# Patient Record
Sex: FEMALE | Race: BLACK OR AFRICAN AMERICAN | NOT HISPANIC OR LATINO | Employment: OTHER | URBAN - METROPOLITAN AREA
[De-identification: names, ages, dates, MRNs, and addresses within clinical notes are randomized per-mention and may not be internally consistent; named-entity substitution may affect disease eponyms.]

---

## 2022-07-05 ENCOUNTER — HOSPITAL ENCOUNTER (EMERGENCY)
Facility: CLINIC | Age: 72
Discharge: HOME | End: 2022-07-05
Attending: INTERNAL MEDICINE | Admitting: INTERNAL MEDICINE
Payer: COMMERCIAL

## 2022-07-05 ENCOUNTER — APPOINTMENT (OUTPATIENT)
Dept: GENERAL RADIOLOGY | Facility: CLINIC | Age: 72
End: 2022-07-05
Attending: INTERNAL MEDICINE
Payer: COMMERCIAL

## 2022-07-05 ENCOUNTER — APPOINTMENT (OUTPATIENT)
Dept: CT IMAGING | Facility: CLINIC | Age: 72
End: 2022-07-05
Attending: INTERNAL MEDICINE
Payer: COMMERCIAL

## 2022-07-05 VITALS
DIASTOLIC BLOOD PRESSURE: 91 MMHG | HEART RATE: 71 BPM | BODY MASS INDEX: 24.84 KG/M2 | WEIGHT: 147 LBS | TEMPERATURE: 97.8 F | OXYGEN SATURATION: 99 % | SYSTOLIC BLOOD PRESSURE: 119 MMHG | RESPIRATION RATE: 16 BRPM
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
## 2022-07-05 DIAGNOSIS — S80.02XA CONTUSION OF LEFT KNEE, INITIAL ENCOUNTER: ICD-10-CM

## 2022-07-05 DIAGNOSIS — S93.509A SPRAIN OF TOE, INITIAL ENCOUNTER: ICD-10-CM

## 2022-07-05 DIAGNOSIS — S10.93XA CONTUSION OF FACE, SCALP AND NECK, INITIAL ENCOUNTER: ICD-10-CM

## 2022-07-05 DIAGNOSIS — S00.83XA CONTUSION OF FACE, SCALP AND NECK, INITIAL ENCOUNTER: ICD-10-CM

## 2022-07-05 DIAGNOSIS — S00.03XA CONTUSION OF FACE, SCALP AND NECK, INITIAL ENCOUNTER: ICD-10-CM

## 2022-07-05 PROCEDURE — 99285 EMERGENCY DEPT VISIT HI MDM: CPT | Mod: 25 | Performed by: INTERNAL MEDICINE

## 2022-07-05 PROCEDURE — 99284 EMERGENCY DEPT VISIT MOD MDM: CPT | Mod: 25 | Performed by: INTERNAL MEDICINE

## 2022-07-05 PROCEDURE — 73562 X-RAY EXAM OF KNEE 3: CPT | Mod: LT

## 2022-07-05 PROCEDURE — 76705 ECHO EXAM OF ABDOMEN: CPT | Mod: 26 | Performed by: INTERNAL MEDICINE

## 2022-07-05 PROCEDURE — 93308 TTE F-UP OR LMTD: CPT | Performed by: INTERNAL MEDICINE

## 2022-07-05 PROCEDURE — 93308 TTE F-UP OR LMTD: CPT | Mod: 26 | Performed by: INTERNAL MEDICINE

## 2022-07-05 PROCEDURE — 73660 X-RAY EXAM OF TOE(S): CPT | Mod: 50

## 2022-07-05 PROCEDURE — 70486 CT MAXILLOFACIAL W/O DYE: CPT

## 2022-07-05 PROCEDURE — 72125 CT NECK SPINE W/O DYE: CPT

## 2022-07-05 PROCEDURE — 76705 ECHO EXAM OF ABDOMEN: CPT | Performed by: INTERNAL MEDICINE

## 2022-07-05 PROCEDURE — 70450 CT HEAD/BRAIN W/O DYE: CPT

## 2022-07-05 ASSESSMENT — ENCOUNTER SYMPTOMS
NECK STIFFNESS: 0
EYE REDNESS: 0
APPETITE CHANGE: 1
CONFUSION: 0
HEADACHES: 0
SHORTNESS OF BREATH: 0
ABDOMINAL PAIN: 1
FEVER: 0
DIFFICULTY URINATING: 0
COLOR CHANGE: 0

## 2022-07-05 NOTE — ED TRIAGE NOTES
Triage Assessment     Row Name 07/05/22 7202       Triage Assessment (Adult)    Airway WDL WDL       Respiratory WDL    Respiratory WDL WDL       Skin Circulation/Temperature WDL    Skin Circulation/Temperature WDL X  swelling, bruising, tenderness to right side of face and left knee, pain to bilateral toes       Cardiac WDL    Cardiac WDL WDL

## 2022-07-06 ASSESSMENT — ENCOUNTER SYMPTOMS
JOINT SWELLING: 1
ARTHRALGIAS: 0
FACIAL SWELLING: 1

## 2022-07-06 NOTE — ED NOTES
Pt c/o falling due to uneven sidewalk and landing on face and L knee. PT has swelling and bruising on L knee. ROM intact. PT able to bear weight and walk, but causes pain. Bruising and pain noted under R eye. Pt also notes broke a front tooth. PT c/o R arm pain at shoulder joint with movement and has decreased ROM.

## 2022-07-06 NOTE — ED PROVIDER NOTES
[facility] EMERGENCY DEPARTMENT ([facility])    7/05/22        History     Chief Complaint   Patient presents with     Fall     Fell Sunday walking uphill; pain and bruising to left knee; pain to bilateral toes; pain, swelling, bruising to right side of face; bit top lip and broke one top front incisor     The history is provided by the patient, a relative and medical records. The history is limited by a language barrier (Maltese). A  was used (Granddaughter).     Byron Jasmine is a 72 year old female with history of osteoarthritis of knee and Vitamin D deficiency. She presents to the ED with pain in her left knee, right arm, and both great toes; and a broken front right tooth. According to her granddaughter, the patient fell on 7/3/22 walking uphill. Patient also complains of increased abdominal pain when she palpates her own abdomen after falling. Patient had previous abdominal surgery for cholecystectomy in 2018. Patient also has decreased appetite since her fall. No associated visual changes.  Patient does have a small abrasion close to her right eye.  She states that she does have appointment to follow-up with a dentist soon.  Patient indicates that following the fall she did have significant right-sided facial swelling which has improved since.          Past Medical History  No past medical history on file.  No past surgical history on file.  acetaminophen (TYLENOL) 325 MG tablet  cholecalciferol 48029 UNITS capsule  Cyanocobalamin (B-12) 1000 MCG TBCR  gabapentin (NEURONTIN) 100 MG capsule  lidocaine (XYLOCAINE) 5 % ointment      No Known Allergies  Family History  Family History   Problem Relation Age of Onset     Family History Negative Mother      Family History Negative Father      Social History   Social History     Tobacco Use     Smoking status: Never Smoker     Smokeless tobacco: Never Used   Substance Use Topics     Alcohol use: No     Drug use: No      Past medical  history, past surgical history, medications, allergies, family history, and social history were reviewed with the patient. No additional pertinent items.       Review of Systems   Constitutional: Positive for appetite change (decreased). Negative for fever.   HENT: Positive for dental problem (front right tooth missing) and facial swelling (right upper). Negative for congestion.    Eyes: Negative for redness and visual disturbance.   Respiratory: Negative for shortness of breath.    Cardiovascular: Negative for chest pain.   Gastrointestinal: Positive for abdominal pain.   Genitourinary: Negative for difficulty urinating.   Musculoskeletal: Positive for joint swelling (left knee and bilateral big toes). Negative for arthralgias and neck stiffness.        Left knee, right arm   Skin: Negative for color change.   Neurological: Negative for headaches.   Psychiatric/Behavioral: Negative for confusion.   All other systems reviewed and are negative.    A complete review of systems was performed with pertinent positives and negatives noted in the HPI, and all other systems negative.    Physical Exam   BP: 125/76  Pulse: 68  Temp: 98.8  F (37.1  C)  Resp: 16  Weight: 66.7 kg (147 lb)  SpO2: 98 %  Physical Exam  Constitutional:       General: She is not in acute distress.     Appearance: She is not diaphoretic.   HENT:      Head: Raccoon eyes, contusion and laceration present. No Ta's sign, abrasion, masses, right periorbital erythema or left periorbital erythema. Hair is normal.      Jaw: There is normal jaw occlusion.        Mouth/Throat:      Pharynx: No oropharyngeal exudate.   Eyes:      General: No scleral icterus.     Pupils: Pupils are equal, round, and reactive to light.   Cardiovascular:      Rate and Rhythm: Normal rate and regular rhythm.      Heart sounds: Normal heart sounds. No murmur heard.    No friction rub. No gallop.   Pulmonary:      Effort: Pulmonary effort is normal. No respiratory distress.       Breath sounds: Normal breath sounds. No stridor. No wheezing, rhonchi or rales.   Chest:      Chest wall: No tenderness.   Abdominal:      General: Abdomen is flat. Bowel sounds are normal. There is no distension.      Palpations: Abdomen is soft. There is no mass.      Tenderness: There is no abdominal tenderness. There is no right CVA tenderness, left CVA tenderness, guarding or rebound.      Hernia: No hernia is present.   Musculoskeletal:      Cervical back: Neck supple.      Right knee: Normal.      Left knee: Swelling and ecchymosis present. No deformity, effusion, erythema, lacerations, bony tenderness or crepitus. Normal range of motion. Tenderness present. Normal alignment, normal meniscus and normal patellar mobility.        Legs:    Skin:     General: Skin is warm.      Findings: No rash.   Neurological:      General: No focal deficit present.      Cranial Nerves: No cranial nerve deficit.         ED Course     9:39 PM  The patient was seen and examined by Marlon Velazquez Md   in Room ED04.   Procedures  Results for orders placed during the hospital encounter of 07/05/22    POC US ABDOMEN LIMITED    Impression  Bedside FAST (Focused Assessment with Sonography in Trauma), performed and interpreted by me.  Indication: Trauma    With the patient in Trendelenburg, the RUQ, LUQ and subxiphoid views were examined for intraabdominal and thoracic free fluid and pericardial effusion. With the patient in reverse Trendelenburg, the suprapubic view was examined for intraabdominal free fluid. Image quality was satisfactory..    Findings: There is no evidence of free fluid above or below bilateral diaphragms, in the splenorenal or hepatorenal space, or in bilateral paracolic gutters. There was no free fluid seen in the pelvis adjacent to the urinary bladder. There is no free fluid within the pericardium.      IMPRESSION:  Negative FAST                     Results for orders placed or performed during the hospital  encounter of 07/05/22   CT Facial Bones without Contrast     Status: None    Narrative    EXAM: CT FACIAL BONES WITHOUT CONTRAST  LOCATION: [facility]  DATE/TIME: 7/5/2022 9:39 PM    INDICATION: fall, facial pain  COMPARISON: None.  TECHNIQUE: Routine CT Maxillofacial without IV contrast. Multiplanar reformats. Dose reduction techniques were used.     FINDINGS:  OSSEOUS STRUCTURES/SOFT TISSUES:   Right inferior frontal scalp mild contusion. No acute fracture. Mandible intact.    Vascular calcifications. Prominent lingual tonsils for age. Dental amalgam resulting in streak artifact. Dental disease with multiple cavities and periapical lucencies.    ORBITAL CONTENTS: No acute abnormality.    SINUSES: Visualized paranasal sinuses and mastoid air cells are essentially clear.    VISUALIZED INTRACRANIAL CONTENTS: No acute abnormality.       Impression    IMPRESSION:   1.  No acute fracture.     Head CT w/o contrast     Status: None    Narrative    EXAM: CT HEAD W/O CONTRAST  LOCATION: [facility]  DATE/TIME: 7/5/2022 9:39 PM    INDICATION: Fall. Blunt head injury.  COMPARISON: None.  TECHNIQUE: Routine CT Head without IV contrast. Multiplanar reformats. Dose reduction techniques were used.    FINDINGS:  INTRACRANIAL CONTENTS: No intracranial hemorrhage, extraaxial collection, or mass effect.  No CT evidence of acute infarct. Normal parenchymal attenuation. Normal ventricles and sulci.     VISUALIZED ORBITS/SINUSES/MASTOIDS: No intraorbital abnormality. No paranasal sinus mucosal disease. No middle ear or mastoid effusion.    BONES/SOFT TISSUES: Right inferior frontal scalp mild soft tissue swelling. No acute displaced calvarial fractures. Vascular calcifications.      Impression    IMPRESSION:  1.  No acute intracranial process.   Cervical spine CT w/o contrast     Status: None    Narrative    EXAM: CT CERVICAL SPINE W/O  CONTRAST  LOCATION: [facility]  DATE/TIME: 7/5/2022 9:40 PM    INDICATION: fall, neck pain  COMPARISON: None.  TECHNIQUE: Routine CT Cervical Spine without IV contrast. Multiplanar reformats. Dose reduction techniques were used.    FINDINGS:  VERTEBRA: No acute fracture.  No acute post traumatic subluxations.   Normal vertebral body heights.    CANAL/FORAMINA: Multilevel spondylosis result in various levels and degrees of central canal stenosis and neural foraminal stenosis. No critical spinal canal stenosis.   Several levels demonstrate mild degenerative grade 1 subluxations.    PARASPINAL: Prominent lingual tonsils for age.        Impression     IMPRESSION:  1.  No acute fracture.  2.  Degenerative changes described above.   XR Knee Left 3 Views     Status: None    Narrative    EXAM: XR KNEE LEFT 3 VIEWS  LOCATION: [facility]  DATE/TIME: 7/5/2022 10:18 PM    INDICATION: fall pain  COMPARISON: None.      Impression    IMPRESSION: Moderate tricompartmental degenerative changes. No plain film evidence of significant joint effusion. No acute bony abnormality.   XR Toe Right G/E 2 Views     Status: None    Narrative    EXAM: XR TOE RIGHT G/E 2 VIEWS  LOCATION: [facility]  DATE/TIME: 7/5/2022 10:18 PM    INDICATION: fall pain  COMPARISON: None.      Impression    IMPRESSION: Moderately advanced degenerative changes at the first metatarsal phalangeal joint as well as the interphalangeal joint. No evidence of fracture.   XR Toe Left G/E 2 Views     Status: None    Narrative    EXAM: XR TOE LEFT G/E 2 VIEWS  LOCATION: [facility]  DATE/TIME: 7/5/2022 10:18 PM    INDICATION: fall pain  COMPARISON: None.      Impression    IMPRESSION: Osteopenia. Degenerative osteoarthritis and mild joint space loss left first MTP joint. Prominent sesamoid along  the lateral aspect of the metatarsal head. No evidence for fracture.   POC US ABDOMEN LIMITED     Status: None    Impression    Bedside FAST (Focused Assessment with Sonography in Trauma), performed and interpreted by me.   Indication: Trauma    With the patient in Trendelenburg, the RUQ, LUQ and subxiphoid views were examined for intraabdominal and thoracic free fluid and pericardial effusion. With the patient in reverse Trendelenburg, the suprapubic view was examined for intraabdominal free fluid. Image quality was satisfactory..     Findings: There is no evidence of free fluid above or below bilateral diaphragms, in the splenorenal or hepatorenal space, or in bilateral paracolic gutters. There was no free fluid seen in the pelvis adjacent to the urinary bladder. There is no free fluid within the pericardium.         IMPRESSION:  Negative FAST     Medications - No data to display     Assessments & Plan (with Medical Decision Making)  Mechanical fall with righ upper facial contusion, dental injury, left knee contusions and bilateral big toe sprain, CT head c spine face neg and XR left knee and bilateral great toes neg. POCUS FAST also neg, will discharge with instruction to use tylenol prn for pain, follow up with her PMD in one week if no improvements or any concerns.       I have reviewed the nursing notes. I have reviewed the findings, diagnosis, plan and need for follow up with the patient.    Discharge Medication List as of 7/5/2022 11:14 PM          Final diagnoses:   Contusion of face, scalp and neck, initial encounter   Contusion of left knee, initial encounter   Sprain of toe, initial encounter   Fall, initial encounter     Yogi MERCEDES am serving as a trained medical scribe to document services personally performed by Marlon Velazquez MD, based on the provider's statements to me.      Marlon MERCEDES MD, was physically present and have reviewed and verified the accuracy of this note documented by Yogi  LIZBETH Romero.   --  Marlon Velazquez MD  [facility] EMERGENCY DEPARTMENT  7/5/2022     Marlon Velazquez MD  07/06/22 0002

## 2022-08-19 ENCOUNTER — HOSPITAL ENCOUNTER (OUTPATIENT)
Dept: GENERAL RADIOLOGY | Facility: CLINIC | Age: 72
Discharge: HOME | End: 2022-08-19
Attending: NURSE PRACTITIONER | Admitting: NURSE PRACTITIONER
Payer: COMMERCIAL

## 2022-08-19 DIAGNOSIS — R76.12 POSITIVE QUANTIFERON-TB GOLD TEST: ICD-10-CM

## 2022-08-19 PROCEDURE — 71046 X-RAY EXAM CHEST 2 VIEWS: CPT | Mod: 26 | Performed by: RADIOLOGY

## 2022-08-19 PROCEDURE — 71046 X-RAY EXAM CHEST 2 VIEWS: CPT

## 2022-10-08 ENCOUNTER — ANCILLARY PROCEDURE (OUTPATIENT)
Dept: CT IMAGING | Facility: CLINIC | Age: 72
End: 2022-10-08
Attending: NURSE PRACTITIONER
Payer: COMMERCIAL

## 2022-10-08 DIAGNOSIS — R76.12 POSITIVE QUANTIFERON-TB GOLD TEST: ICD-10-CM

## 2022-10-08 DIAGNOSIS — R91.8 OPACITIES OF BOTH LUNGS PRESENT ON CHEST X-RAY: ICD-10-CM

## 2022-10-08 PROCEDURE — 71250 CT THORAX DX C-: CPT | Performed by: RADIOLOGY

## 2022-12-07 ENCOUNTER — TRANSCRIBE ORDERS (OUTPATIENT)
Dept: OTHER | Age: 72
End: 2022-12-07

## 2022-12-07 DIAGNOSIS — R76.12 POSITIVE QUANTIFERON-TB GOLD TEST: ICD-10-CM

## 2022-12-07 DIAGNOSIS — R93.89 ABNORMAL CHEST CT: Primary | ICD-10-CM

## 2022-12-15 ENCOUNTER — ANCILLARY PROCEDURE (OUTPATIENT)
Dept: ULTRASOUND IMAGING | Facility: CLINIC | Age: 72
End: 2022-12-15
Attending: NURSE PRACTITIONER
Payer: COMMERCIAL

## 2022-12-15 DIAGNOSIS — E04.2 MULTIPLE THYROID NODULES: ICD-10-CM

## 2022-12-15 PROCEDURE — 76536 US EXAM OF HEAD AND NECK: CPT | Performed by: SURGERY

## 2022-12-17 NOTE — TELEPHONE ENCOUNTER
RECORDS RECEIVED FROM: CE   DATE RECEIVED: 12.21.22   NOTES (Gather within 2 years) STATUS DETAILS   OFFICE NOTE from referring provider   CE 12.6.22, 9.1.22, 8.18.22, 8.16.22  Liborio EHLTON   OFFICE NOTE from other specialist CE 7.26.22, 7.20.22  Jonathan HELTON   LABS (any labs) CE    MEDICATION LIST CE    IMAGING  (NEED IMAGES AND REPORTS)     Other (anything related to diagnoses Internal 10.8.22  CT Chest    8.19.22  XR Chest

## 2022-12-21 ENCOUNTER — OFFICE VISIT (OUTPATIENT)
Dept: INFECTIOUS DISEASES | Facility: CLINIC | Age: 72
End: 2022-12-21
Attending: NURSE PRACTITIONER
Payer: COMMERCIAL

## 2022-12-21 ENCOUNTER — PRE VISIT (OUTPATIENT)
Dept: INFECTIOUS DISEASES | Facility: CLINIC | Age: 72
End: 2022-12-21

## 2022-12-21 VITALS
HEART RATE: 91 BPM | OXYGEN SATURATION: 98 % | RESPIRATION RATE: 18 BRPM | SYSTOLIC BLOOD PRESSURE: 128 MMHG | TEMPERATURE: 99.2 F | DIASTOLIC BLOOD PRESSURE: 77 MMHG | WEIGHT: 149.5 LBS | BODY MASS INDEX: 25.52 KG/M2 | HEIGHT: 64 IN

## 2022-12-21 DIAGNOSIS — R93.89 ABNORMAL CHEST CT: ICD-10-CM

## 2022-12-21 DIAGNOSIS — R05.3 CHRONIC COUGH: ICD-10-CM

## 2022-12-21 DIAGNOSIS — R76.12 POSITIVE QUANTIFERON-TB GOLD TEST: Primary | ICD-10-CM

## 2022-12-21 LAB
GRAM STAIN RESULT: NORMAL

## 2022-12-21 PROCEDURE — 99204 OFFICE O/P NEW MOD 45 MIN: CPT | Mod: GC | Performed by: STUDENT IN AN ORGANIZED HEALTH CARE EDUCATION/TRAINING PROGRAM

## 2022-12-21 PROCEDURE — 87206 SMEAR FLUORESCENT/ACID STAI: CPT | Performed by: STUDENT IN AN ORGANIZED HEALTH CARE EDUCATION/TRAINING PROGRAM

## 2022-12-21 PROCEDURE — 87205 SMEAR GRAM STAIN: CPT | Performed by: STUDENT IN AN ORGANIZED HEALTH CARE EDUCATION/TRAINING PROGRAM

## 2022-12-21 PROCEDURE — 87116 MYCOBACTERIA CULTURE: CPT | Performed by: STUDENT IN AN ORGANIZED HEALTH CARE EDUCATION/TRAINING PROGRAM

## 2022-12-21 PROCEDURE — G0463 HOSPITAL OUTPT CLINIC VISIT: HCPCS | Performed by: STUDENT IN AN ORGANIZED HEALTH CARE EDUCATION/TRAINING PROGRAM

## 2022-12-21 PROCEDURE — G0463 HOSPITAL OUTPT CLINIC VISIT: HCPCS

## 2022-12-21 RX ORDER — LORATADINE 10 MG/1
1 TABLET ORAL DAILY PRN
COMMUNITY
Start: 2022-11-03

## 2022-12-21 RX ORDER — MULTIVITAMIN
1 TABLET ORAL DAILY
COMMUNITY
Start: 2022-09-26

## 2022-12-21 ASSESSMENT — PAIN SCALES - GENERAL: PAINLEVEL: NO PAIN (0)

## 2022-12-21 NOTE — LETTER
12/21/2022      RE: Byron Jasmine  [address]  [place] [address]        [facility]    Division of Infectious Diseases and International Medicine    CLINICAL INFECTIOUS DISEASE OUTPATIENT CONSULTATION NOTE     Patient:  Byron Jasmine, Date of birth 1950, Medical record number [id number]  Referred by: Kaylee Capone   Reason for Visit: + Quantiferon gold     Per patient request family member served as [facility]  for this visit   This interview was conducted in negative airflow room with appropriate PPE         Assessment and Plan   1. 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 Following sputum culture collection I will see her back in clinic (in about 2 months) to potentially discuss LTBI treatment options.     PLAN:   - AFB sputum smear/culture x3, gave patient cups for home collection   - Sputum gram stain with AFB studies   - If AFB smear/cultures negative likely LTBI and will have her RTC in 2 months to discuss treatment options further     RTC 2 months        History of Present Illness:     Byron Jasmine is a 72 year old y.o with a PMH of who presents to clinic for evaluation of abnormal chest imaging and positive quantiferon gold.     She was born in [facility] [place] [facility] and actually had a family member with TB growing up. 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 She denies personal h/o TB or prior TB treatments. Denies periods of homelessness or incarceration. She currently denies SOB. She has had chronic cough for years where she feels she needs to clear some phlegm from her chest. Sputum is clear/white, denies hemoptysis. She denies fevers, chills, night  sweats, weight changes. She denies n/v/d, abdominal pain, LAD, LE swelling. She believes that her cough and CT findings are a result of a severe PNA she had back in 2015 while in [place]. She required supplemental O2 at that time, but was not intubated. Ever since then cough has been worse.          Key Prior Lab/Imaging and other data   7/20/22 Quantiferon gold POSITIVE     8/19/22 CXR   Impression:    1. No acute airspace dilatation.  2.  There is no definitive evidence for active tuberculosis, however  nodular opacities in the right middle/lower lobe, and left lower lobe  are nonspecific and may represent scar, plaque, malignancy,  infection/inflammation. Suggest obtaining old films for evaluation, if  old films not able to be obtained suggest CT for better  characterization.     10/8/22 CT CHEST  IMPRESSION:   1. Tree-in-bud nodularity in the anterior right upper lobe is  suspicious for infection, including atypical organisms. Primary and  post primary mycobacterium tuberculosis and, given areas of  bronchiectasis particularly in the right middle lobe and lingula,  mycobacterium avium complex are considerations.  2. Calcified mediastinal/hilar lymph nodes suggests prior  granulomatous disease, including mycobacterium tuberculosis.  3. Multinodular enlargement of the thyroid, better characterized on comparison ultrasound.       Review of Systems:     The following systems were reviewed with the patient as they pertain to the case and are negative unless noted here or above in the HPI. The patient was  able to participate in the following review of systems    REVIEW OF SYSTEMS:   Constitutional: No fevers, no chills, no sweats  Cardiac: No history of chest pain, No palpitations  Respiratory: No shortness of breath, no wheeze, + cough  Gastro Intestinal: No history of abdominal pain, no vomiting, no diarrhea  Neurological: No headaches, no new or changing weakness, no new or changing numbness  Musculoskeletal: No  "joint pain or swelling HEENT: No congestion No stridor  Psychiatric: No reported hallucinations, No obvious delusions  Allergic: No Hives No Rash  Hematologic: No Easy Bruising, No Nosebleeds,   Genitourinary: No Dysuria, No Frequency  Endocrine: No Polyuria, No Polydipsia  Skin/Integumentary: No Rash, No Ulcer  Opthalmologic: No Diplopia, No Flashes        Other Medical History:     Attempt was made to collect past, family and social history during this encounter,  this information was reviewed with the patient and updated    Allergies Patient has no known allergies.    Past Medical History  No past medical history on file. PastSurgical History   has no past surgical history on file.   Family History  Family History   Problem Relation Age of Onset     Family History Negative Mother      Family History Negative Father     Social History  She reports that she has never smoked. She has never used smokeless tobacco. She reports that she does not drink alcohol and does not use drugs.   Notable Exposures Listed below if pertinent   See HPI Vaccination History:  Immunization History   Administered Date(s) Administered     COVID-19 Vaccine 18+ (Moderna) 03/16/2021, 04/13/2021, 11/18/2021     Poliovirus, inactivated (IPV) 12/27/2011     Tdap (Adacel,Boostrix) 03/28/2013             Physical Exam:     VITAL SIGNS:  Blood pressure 128/77, pulse 91, temperature 99.2  F (37.3  C), temperature source Oral, resp. rate 18, height 1.638 m (5' 4.49\"), weight 67.8 kg (149 lb 8 oz), SpO2 98 %.     GENERAL APPEARANCE: Not in acute distress  PHYSICAL EXAM:  Eyes:     No ptosis, no discharge, no scleral icterus  Mouth, Throat:     Mucous membranes moist, pharynx normal without lesions  Cardiovascular:    Inspection: No Cyanosis, JVD not elevated   Auscultation:  S1, S2 normal, regular rate and rhythm  Respiratory:     Inspection: Not in respiratory distress, Chest expansion symmetrical   Auscultation: 4 point auscultation done clear to " auscultation bilaterally except for slight crackles RML, no wheezes, no rales, and no rhonchi  Musculoskeletal:     no elbow wrist knee or ankle tenderness, deformity or swelling, no quadriceps calf or upper arm muscular tenderness noted  Skin:     Dry and intact  Neurologic:     Higher Mental Function: Conversant, AOx4   Facial asymmetry grossly absent   Patient is ambulatory   Psychiatric:     Appropriate        Signature:     Cori Cheng MD   Infectious Disease Fellow    Case discussed with the supervising attending ID physician, Dr Block    This dictation was prepared in part using Dragon recognition software.  As a result errors may occur. When identified these transcription errors have been corrected.  While every attempt is made to correct errors during dictation, errors may still exist         Cori Cheng MD

## 2022-12-21 NOTE — NURSING NOTE
"Chief Complaint   Patient presents with     Consult     Possible TB     Vital signs:  Temp: 99.2  F (37.3  C) Temp src: Oral BP: 128/77 Pulse: 91   Resp: 18 SpO2: 98 %     Height: 163.8 cm (5' 4.49\") Weight: 67.8 kg (149 lb 8 oz)  Estimated body mass index is 25.27 kg/m  as calculated from the following:    Height as of this encounter: 1.638 m (5' 4.49\").    Weight as of this encounter: 67.8 kg (149 lb 8 oz).        Kasey Lloyd, [facility]  12/21/2022 3:16 PM      "

## 2022-12-21 NOTE — PROGRESS NOTES
[facility]    Division of Infectious Diseases and International Medicine    CLINICAL INFECTIOUS DISEASE OUTPATIENT CONSULTATION NOTE     Patient:  Byron Jasmine, Date of birth 1950, Medical record number [id number]  Referred by: Kaylee Capone   Reason for Visit: + Quantiferon gold     Per patient request family member served as [place]  for this visit   This interview was conducted in negative airflow room with appropriate PPE         Assessment and Plan   1. 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 Following sputum culture collection I will see her back in clinic (in about 2 months) to potentially discuss LTBI treatment options.     PLAN:   - AFB sputum smear/culture x3, gave patient cups for home collection   - Sputum gram stain with AFB studies   - If AFB smear/cultures negative likely LTBI and will have her RTC in 2 months to discuss treatment options further     RTC 2 months        History of Present Illness:     Byron Jasmine is a 72 year old y.o with a PMH of who presents to clinic for evaluation of abnormal chest imaging and positive quantiferon gold.     [facility] [place] [place] and actually had a family member with TB growing up. 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 She denies personal h/o TB or prior TB treatments. Denies periods of homelessness or incarceration. She currently denies SOB. She has had chronic cough for years where she feels she needs to clear some phlegm from her chest. Sputum is clear/white, denies hemoptysis. She denies fevers, chills, night sweats, weight changes. She denies n/v/d, abdominal pain, LAD, LE swelling. She  believes that her cough and CT findings are a result of a severe PNA she had back in 2015 while in [place]. She required supplemental O2 at that time, but was not intubated. Ever since then cough has been worse.          Key Prior Lab/Imaging and other data   7/20/22 Quantiferon gold POSITIVE     8/19/22 CXR   Impression:    1. No acute airspace dilatation.  2.  There is no definitive evidence for active tuberculosis, however  nodular opacities in the right middle/lower lobe, and left lower lobe  are nonspecific and may represent scar, plaque, malignancy,  infection/inflammation. Suggest obtaining old films for evaluation, if  old films not able to be obtained suggest CT for better  characterization.     10/8/22 CT CHEST  IMPRESSION:   1. Tree-in-bud nodularity in the anterior right upper lobe is  suspicious for infection, including atypical organisms. Primary and  post primary mycobacterium tuberculosis and, given areas of  bronchiectasis particularly in the right middle lobe and lingula,  mycobacterium avium complex are considerations.  2. Calcified mediastinal/hilar lymph nodes suggests prior  granulomatous disease, including mycobacterium tuberculosis.  3. Multinodular enlargement of the thyroid, better characterized on comparison ultrasound.       Review of Systems:     The following systems were reviewed with the patient as they pertain to the case and are negative unless noted here or above in the HPI. The patient was  able to participate in the following review of systems    REVIEW OF SYSTEMS:   Constitutional: No fevers, no chills, no sweats  Cardiac: No history of chest pain, No palpitations  Respiratory: No shortness of breath, no wheeze, + cough  Gastro Intestinal: No history of abdominal pain, no vomiting, no diarrhea  Neurological: No headaches, no new or changing weakness, no new or changing numbness  Musculoskeletal: No joint pain or swelling HEENT: No congestion No stridor  Psychiatric: No reported  "hallucinations, No obvious delusions  Allergic: No Hives No Rash  Hematologic: No Easy Bruising, No Nosebleeds,   Genitourinary: No Dysuria, No Frequency  Endocrine: No Polyuria, No Polydipsia  Skin/Integumentary: No Rash, No Ulcer  Opthalmologic: No Diplopia, No Flashes        Other Medical History:     Attempt was made to collect past, family and social history during this encounter,  this information was reviewed with the patient and updated    Allergies Patient has no known allergies.    Past Medical History  No past medical history on file. PastSurgical History   has no past surgical history on file.   Family History  Family History   Problem Relation Age of Onset     Family History Negative Mother      Family History Negative Father     Social History  She reports that she has never smoked. She has never used smokeless tobacco. She reports that she does not drink alcohol and does not use drugs.   Notable Exposures Listed below if pertinent   See HPI Vaccination History:  Immunization History   Administered Date(s) Administered     COVID-19 Vaccine 18+ (Moderna) 03/16/2021, 04/13/2021, 11/18/2021     Poliovirus, inactivated (IPV) 12/27/2011     Tdap (Adacel,Boostrix) 03/28/2013             Physical Exam:     VITAL SIGNS:  Blood pressure 128/77, pulse 91, temperature 99.2  F (37.3  C), temperature source Oral, resp. rate 18, height 1.638 m (5' 4.49\"), weight 67.8 kg (149 lb 8 oz), SpO2 98 %.     GENERAL APPEARANCE: Not in acute distress  PHYSICAL EXAM:  Eyes:     No ptosis, no discharge, no scleral icterus  Mouth, Throat:     Mucous membranes moist, pharynx normal without lesions  Cardiovascular:    Inspection: No Cyanosis, JVD not elevated   Auscultation:  S1, S2 normal, regular rate and rhythm  Respiratory:     Inspection: Not in respiratory distress, Chest expansion symmetrical   Auscultation: 4 point auscultation done clear to auscultation bilaterally except for slight crackles RML, no wheezes, no rales, and " no rhonchi  Musculoskeletal:     no elbow wrist knee or ankle tenderness, deformity or swelling, no quadriceps calf or upper arm muscular tenderness noted  Skin:     Dry and intact  Neurologic:     Higher Mental Function: Conversant, AOx4   Facial asymmetry grossly absent   Patient is ambulatory   Psychiatric:     Appropriate        Signature:     Cori Cheng MD   Infectious Disease Fellow    Case discussed with the supervising attending ID physician, Dr Block    This dictation was prepared in part using Dragon recognition software.  As a result errors may occur. When identified these transcription errors have been corrected.  While every attempt is made to correct errors during dictation, errors may still exist

## 2022-12-21 NOTE — LETTER
12/21/2022       RE: Byron Jasmine  [address]  [place] [address]     Dear Colleague,    Thank you for referring your patient, Byron Jasmine, to the [facility] INFECTIOUS DISEASE CLINIC [place] at [facility]. Please see a copy of my visit note below.     [facility]    Division of Infectious Diseases and International Medicine    CLINICAL INFECTIOUS DISEASE OUTPATIENT CONSULTATION NOTE     Patient:  Byron Jasmine, Date of birth 1950, Medical record number [id number]  Referred by: Kaylee Capone   Reason for Visit: + Quantiferon gold     Per patient request family member served as [facility]  for this visit   This interview was conducted in negative airflow room with appropriate PPE         Assessment and Plan   1. 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 Following sputum culture collection I will see her back in clinic (in about 2 months) to potentially discuss LTBI treatment options.     PLAN:   - AFB sputum smear/culture x3, gave patient cups for home collection   - Sputum gram stain with AFB studies   - If AFB smear/cultures negative likely LTBI and will have her RTC in 2 months to discuss treatment options further     RTC 2 months        History of Present Illness:     Byron Jasmine is a 72 year old y.o with a PMH of who presents to clinic for evaluation of abnormal chest imaging and positive quantiferon gold.     She was born in [place] and actually had a family member with TB growing up. 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 She denies personal h/o TB or prior TB treatments. Denies  periods of homelessness or incarceration. She currently denies SOB. She has had chronic cough for years where she feels she needs to clear some phlegm from her chest. Sputum is clear/white, denies hemoptysis. She denies fevers, chills, night sweats, weight changes. She denies n/v/d, abdominal pain, LAD, LE swelling. She believes that her cough and CT findings are a result of a severe PNA she had back in 2015 while in [facility]. She required supplemental O2 at that time, but was not intubated. Ever since then cough has been worse.          Key Prior Lab/Imaging and other data   7/20/22 Quantiferon gold POSITIVE     8/19/22 CXR   Impression:    1. No acute airspace dilatation.  2.  There is no definitive evidence for active tuberculosis, however  nodular opacities in the right middle/lower lobe, and left lower lobe  are nonspecific and may represent scar, plaque, malignancy,  infection/inflammation. Suggest obtaining old films for evaluation, if  old films not able to be obtained suggest CT for better  characterization.     10/8/22 CT CHEST  IMPRESSION:   1. Tree-in-bud nodularity in the anterior right upper lobe is  suspicious for infection, including atypical organisms. Primary and  post primary mycobacterium tuberculosis and, given areas of  bronchiectasis particularly in the right middle lobe and lingula,  mycobacterium avium complex are considerations.  2. Calcified mediastinal/hilar lymph nodes suggests prior  granulomatous disease, including mycobacterium tuberculosis.  3. Multinodular enlargement of the thyroid, better characterized on comparison ultrasound.       Review of Systems:     The following systems were reviewed with the patient as they pertain to the case and are negative unless noted here or above in the HPI. The patient was  able to participate in the following review of systems    REVIEW OF SYSTEMS:   Constitutional: No fevers, no chills, no sweats  Cardiac: No history of chest pain, No  "palpitations  Respiratory: No shortness of breath, no wheeze, + cough  Gastro Intestinal: No history of abdominal pain, no vomiting, no diarrhea  Neurological: No headaches, no new or changing weakness, no new or changing numbness  Musculoskeletal: No joint pain or swelling HEENT: No congestion No stridor  Psychiatric: No reported hallucinations, No obvious delusions  Allergic: No Hives No Rash  Hematologic: No Easy Bruising, No Nosebleeds,   Genitourinary: No Dysuria, No Frequency  Endocrine: No Polyuria, No Polydipsia  Skin/Integumentary: No Rash, No Ulcer  Opthalmologic: No Diplopia, No Flashes        Other Medical History:     Attempt was made to collect past, family and social history during this encounter,  this information was reviewed with the patient and updated    Allergies Patient has no known allergies.    Past Medical History  No past medical history on file. PastSurgical History   has no past surgical history on file.   Family History  Family History   Problem Relation Age of Onset     Family History Negative Mother      Family History Negative Father     Social History  She reports that she has never smoked. She has never used smokeless tobacco. She reports that she does not drink alcohol and does not use drugs.   Notable Exposures Listed below if pertinent   See HPI Vaccination History:  Immunization History   Administered Date(s) Administered     COVID-19 Vaccine 18+ (Moderna) 03/16/2021, 04/13/2021, 11/18/2021     Poliovirus, inactivated (IPV) 12/27/2011     Tdap (Adacel,Boostrix) 03/28/2013             Physical Exam:     VITAL SIGNS:  Blood pressure 128/77, pulse 91, temperature 99.2  F (37.3  C), temperature source Oral, resp. rate 18, height 1.638 m (5' 4.49\"), weight 67.8 kg (149 lb 8 oz), SpO2 98 %.     GENERAL APPEARANCE: Not in acute distress  PHYSICAL EXAM:  Eyes:     No ptosis, no discharge, no scleral icterus  Mouth, Throat:     Mucous membranes moist, pharynx normal without " lesions  Cardiovascular:    Inspection: No Cyanosis, JVD not elevated   Auscultation:  S1, S2 normal, regular rate and rhythm  Respiratory:     Inspection: Not in respiratory distress, Chest expansion symmetrical   Auscultation: 4 point auscultation done clear to auscultation bilaterally except for slight crackles RML, no wheezes, no rales, and no rhonchi  Musculoskeletal:     no elbow wrist knee or ankle tenderness, deformity or swelling, no quadriceps calf or upper arm muscular tenderness noted  Skin:     Dry and intact  Neurologic:     Higher Mental Function: Conversant, AOx4   Facial asymmetry grossly absent   Patient is ambulatory   Psychiatric:     Appropriate        Signature:     Cori Cheng MD   Infectious Disease Fellow    Case discussed with the supervising attending ID physician, Dr Block    This dictation was prepared in part using Dragon recognition software.  As a result errors may occur. When identified these transcription errors have been corrected.  While every attempt is made to correct errors during dictation, errors may still exist       Infectious Disease Clinic Staff Note: Ms. Jasmine was seen, examined, and the case was discussed with Dr. Cheng, ID Fellow -- I agree with her consultative history and examination, assessment and plan in this outpatient ID Consult note. This note reflects my observations and opinions and the plan outlined fully reflects my approach. I have reviewed the available history, radiology, laboratory results, and reports with the Fellow.      Again, thank you for allowing me to participate in the care of your patient.      Sincerely,    Cori Cheng MD

## 2022-12-21 NOTE — PATIENT INSTRUCTIONS
Please stay at home until your test results are negative. You will need to submit 3 sputum samples - this should be something that you cough up and try not to have too much spit from your mouth. Sometimes people find that the early morning is the best time to collect a sample. Please refrigerate the sample immediately after you collect it until you can drop it off at any [place] lab. It is preferable to drop this off the day it is collected. You cannot submit more than one sample per day    I will see you back in about 2 months after these samples are collected so we can discuss if you need treatment for what we call latent or hidden TB.

## 2022-12-22 PROCEDURE — 87158 CULTURE TYPING ADDED METHOD: CPT | Performed by: STUDENT IN AN ORGANIZED HEALTH CARE EDUCATION/TRAINING PROGRAM

## 2022-12-22 PROCEDURE — 87116 MYCOBACTERIA CULTURE: CPT | Performed by: STUDENT IN AN ORGANIZED HEALTH CARE EDUCATION/TRAINING PROGRAM

## 2022-12-22 PROCEDURE — 87206 SMEAR FLUORESCENT/ACID STAI: CPT | Performed by: STUDENT IN AN ORGANIZED HEALTH CARE EDUCATION/TRAINING PROGRAM

## 2022-12-22 PROCEDURE — 87205 SMEAR GRAM STAIN: CPT | Performed by: STUDENT IN AN ORGANIZED HEALTH CARE EDUCATION/TRAINING PROGRAM

## 2022-12-22 PROCEDURE — 87556 M.TUBERCULO DNA AMP PROBE: CPT | Performed by: STUDENT IN AN ORGANIZED HEALTH CARE EDUCATION/TRAINING PROGRAM

## 2022-12-23 PROCEDURE — 87158 CULTURE TYPING ADDED METHOD: CPT | Performed by: STUDENT IN AN ORGANIZED HEALTH CARE EDUCATION/TRAINING PROGRAM

## 2022-12-23 PROCEDURE — 87206 SMEAR FLUORESCENT/ACID STAI: CPT | Performed by: STUDENT IN AN ORGANIZED HEALTH CARE EDUCATION/TRAINING PROGRAM

## 2022-12-23 PROCEDURE — 87116 MYCOBACTERIA CULTURE: CPT | Performed by: STUDENT IN AN ORGANIZED HEALTH CARE EDUCATION/TRAINING PROGRAM

## 2022-12-23 PROCEDURE — 87205 SMEAR GRAM STAIN: CPT | Performed by: STUDENT IN AN ORGANIZED HEALTH CARE EDUCATION/TRAINING PROGRAM

## 2022-12-26 LAB
GRAM STAIN RESULT: NORMAL

## 2022-12-28 ENCOUNTER — TELEPHONE (OUTPATIENT)
Dept: INFECTIOUS DISEASES | Facility: CLINIC | Age: 72
End: 2022-12-28

## 2022-12-28 DIAGNOSIS — A15.0 TB (PULMONARY TUBERCULOSIS): Primary | ICD-10-CM

## 2022-12-28 LAB
ANNOTATION COMMENT IMP: NOT DETECTED
DEPRECATED M TB RPOB XXX QL NAA+PROBE: NORMAL
M TB DNA SPEC QL NAA+PROBE: NOT DETECTED

## 2022-12-28 RX ORDER — ETHAMBUTOL HYDROCHLORIDE 400 MG/1
1200 TABLET, FILM COATED ORAL DAILY
Qty: 90 TABLET | Refills: 0 | Status: CANCELLED | OUTPATIENT
Start: 2022-12-28

## 2022-12-28 RX ORDER — ISONIAZID 300 MG/1
300 TABLET ORAL DAILY
Qty: 30 TABLET | Refills: 0 | Status: CANCELLED | OUTPATIENT
Start: 2022-12-28

## 2022-12-28 RX ORDER — RIFAMPIN 300 MG/1
600 CAPSULE ORAL DAILY
Qty: 60 CAPSULE | Refills: 0 | Status: CANCELLED | OUTPATIENT
Start: 2022-12-28

## 2022-12-28 RX ORDER — PYRAZINAMIDE TABLET 500 MG/1
1500 TABLET ORAL DAILY
Qty: 90 TABLET | Refills: 0 | Status: CANCELLED | OUTPATIENT
Start: 2022-12-28

## 2022-12-28 NOTE — TELEPHONE ENCOUNTER
I called Byron regarding her lab tests with the assistance of a [employer] telephone . One AFB smear positive 1+ from 12/22, cultures are pending. Given chronic cough and CT imaging findings elected to start presumptive therapy but patient was not amenable to starting medication even after a prolonged discussion. I instructed the patient to isolate from others and I will also alert [facility] for next steps.     This plan was discussed with the attending physician    oCri Cheng MD   Infectious Disease Fellow

## 2022-12-29 ENCOUNTER — TELEPHONE (OUTPATIENT)
Dept: INFECTIOUS DISEASES | Facility: CLINIC | Age: 72
End: 2022-12-29

## 2022-12-29 NOTE — TELEPHONE ENCOUNTER
[facility] Call Center    Phone Message    May a detailed message be left on voicemail: yes     Reason for Call: Other: Patient son called wondering if doctor could give him a call to explain further about the medication discussed with his mother on the phone yesterday. Please call. Thank you    Action Taken: Message routed to:  Other: ID    Travel Screening: Not Applicable

## 2022-12-30 ENCOUNTER — TELEPHONE (OUTPATIENT)
Dept: INFECTIOUS DISEASES | Facility: CLINIC | Age: 72
End: 2022-12-30

## 2022-12-30 NOTE — TELEPHONE ENCOUNTER
----- Message from Cori Cheng MD sent at 12/29/2022 11:11 AM CST -----  Regarding: Records  Hi,     We are referring Byron to [facility] public health clinic after I chatted with NEFTALI this morning. They are requesting faxed records if you could help with that.     Fax is [phone]    They are requesting her AFB smear results and I would probably also send her MTB PCR     Thanks so much for the help,  Aretha

## 2022-12-30 NOTE — TELEPHONE ENCOUNTER
Faxed requested [place] [facility].       Steve Urbano RN  Infectious Disease 9:44 AM 12/30/22

## 2022-12-30 NOTE — TELEPHONE ENCOUNTER
M for Osmel to call back around 930am 12/30, again went to  on second attempt.     Attempted to connect again 1/4 and [facility].     In the interim I was able to connect with [facility] who referred case to [facility] TB clinic follow up. I did speak with [facility] staff as well and requested records to be faxed to them. They were planning to reach out to patient for follow up.     Coir Cheng MD   Infectious Disease Fellow

## 2023-01-02 NOTE — PROGRESS NOTES
Infectious Disease Clinic Staff Note: Ms. Jasmine was seen, examined, and the case was discussed with Dr. Cheng, ID Fellow -- I agree with her consultative history and examination, assessment and plan in this outpatient ID Consult note. This note reflects my observations and opinions and the plan outlined fully reflects my approach. I have reviewed the available history, radiology, laboratory results, and reports with the Fellow.

## 2023-01-09 ENCOUNTER — TRANSCRIBE ORDERS (OUTPATIENT)
Dept: OTHER | Age: 73
End: 2023-01-09

## 2023-01-09 DIAGNOSIS — M79.10 MUSCLE ACHE: ICD-10-CM

## 2023-01-09 DIAGNOSIS — R20.2 PARESTHESIA OF BOTH FEET: ICD-10-CM

## 2023-01-09 DIAGNOSIS — R20.2 PARESTHESIA OF BOTH HANDS: Primary | ICD-10-CM

## 2023-01-10 ENCOUNTER — TRANSCRIBE ORDERS (OUTPATIENT)
Dept: OTHER | Age: 73
End: 2023-01-10

## 2023-01-10 DIAGNOSIS — R20.2 PARESTHESIA OF BOTH HANDS: Primary | ICD-10-CM

## 2023-01-10 DIAGNOSIS — R20.2 PARESTHESIA OF BOTH FEET: ICD-10-CM

## 2023-01-10 DIAGNOSIS — M79.10 MUSCLE ACHE: ICD-10-CM

## 2023-01-12 ENCOUNTER — TRANSCRIBE ORDERS (OUTPATIENT)
Dept: OTHER | Age: 73
End: 2023-01-12

## 2023-01-12 DIAGNOSIS — R20.2 PARESTHESIA OF BOTH HANDS: Primary | ICD-10-CM

## 2023-01-21 LAB
ACID FAST STAIN (ARUP): ABNORMAL
ORGANISM (ARUP): ABNORMAL
ORGANISM (ARUP): ABNORMAL

## 2023-01-24 ENCOUNTER — TELEPHONE (OUTPATIENT)
Dept: INFECTIOUS DISEASES | Facility: CLINIC | Age: 73
End: 2023-01-24
Payer: COMMERCIAL

## 2023-01-24 NOTE — TELEPHONE ENCOUNTER
Faxed results to the [facility].       Steve Urbano RN  Infectious Disease 9:35 AM 01/24/23

## 2023-01-24 NOTE — TELEPHONE ENCOUNTER
----- Message from Cori Cheng MD sent at 1/23/2023  4:54 PM CST -----  Regarding: FW: Records  Hi,     I was paged by the lab that one of the [facility] AFB cultures is now growing M.Tuberculosis. If you could page those records the the fax number below that would be very helpful. I will give them a call tomorrow to let them know.     Thanks,   Aretha     ----- Message -----  From: Cori Cheng MD  Sent: 12/29/2022  11:13 AM CST  To: [facility] Infectious Disease Adult [facility]  Subject: Records                                          Hi,     We are referring Byron to [facility] public health clinic after I chatted with NEFTALI this morning. They are requesting faxed records if you could help with that.     Fax is [phone]    They are requesting her AFB smear results and I would probably also send her MTB PCR     Thanks so much for the help,  Aretha

## 2023-02-05 LAB
ACID FAST STAIN (ARUP): ABNORMAL
ORGANISM (ARUP): ABNORMAL

## 2023-04-22 NOTE — TELEPHONE ENCOUNTER
RECORDS RECEIVED FROM:    REASON FOR VISIT: Paresthesia    Date of Appt: 6/23/2023   NOTES (FOR ALL VISITS) STATUS DETAILS   OFFICE NOTE from referring provider SRIKANTH Capone-1/9/2023-[facility] [place]    OFFICE NOTE from other specialist     DISCHARGE SUMMARY from hospital     DISCHARGE REPORT from the ER     OPERATIVE REPORT     [facility] ([facility] ONLY)          EEG     MEDICATION LIST     IMAGING  (FOR ALL VISITS)     LUMBAR PUNCTURE     KYLIE SCAN     ULTRASOUND (CAROTID BILAT) *VASCULAR*     MRI (HEAD, NECK, SPINE)     CT (HEAD, NECK, SPINE) PACS  CT Head-7/5/2022    CT Cervical Spine-7/5/2022    CT Facial Bones-7/5/2022

## 2023-06-23 ENCOUNTER — PRE VISIT (OUTPATIENT)
Dept: NEUROLOGY | Facility: CLINIC | Age: 73
End: 2023-06-23

## 2024-09-02 ENCOUNTER — HOSPITAL ENCOUNTER (INPATIENT)
Facility: CLINIC | Age: 74
LOS: 1 days | Discharge: HOME | DRG: 394 | End: 2024-09-03
Attending: EMERGENCY MEDICINE | Admitting: FAMILY MEDICINE
Payer: COMMERCIAL

## 2024-09-02 DIAGNOSIS — K56.600 PARTIAL SMALL BOWEL OBSTRUCTION (H): ICD-10-CM

## 2024-09-02 DIAGNOSIS — L03.211 FACIAL CELLULITIS: ICD-10-CM

## 2024-09-02 DIAGNOSIS — R51.9 NONINTRACTABLE HEADACHE, UNSPECIFIED CHRONICITY PATTERN, UNSPECIFIED HEADACHE TYPE: ICD-10-CM

## 2024-09-02 LAB
APTT PPP: 28 SECONDS (ref 22–38)
BASOPHILS # BLD AUTO: 0 10E3/UL (ref 0–0.2)
BASOPHILS NFR BLD AUTO: 0 %
EOSINOPHIL # BLD AUTO: 0 10E3/UL (ref 0–0.7)
EOSINOPHIL NFR BLD AUTO: 0 %
ERYTHROCYTE [DISTWIDTH] IN BLOOD BY AUTOMATED COUNT: 12.6 % (ref 10–15)
HCT VFR BLD AUTO: 36.9 % (ref 35–47)
HGB BLD-MCNC: 12.5 G/DL (ref 11.7–15.7)
IMM GRANULOCYTES # BLD: 0.1 10E3/UL
IMM GRANULOCYTES NFR BLD: 1 %
INR PPP: 1.06 (ref 0.85–1.15)
LACTATE SERPL-SCNC: 1.5 MMOL/L (ref 0.7–2)
LYMPHOCYTES # BLD AUTO: 1 10E3/UL (ref 0.8–5.3)
LYMPHOCYTES NFR BLD AUTO: 11 %
MCH RBC QN AUTO: 30.5 PG (ref 26.5–33)
MCHC RBC AUTO-ENTMCNC: 33.9 G/DL (ref 31.5–36.5)
MCV RBC AUTO: 90 FL (ref 78–100)
MONOCYTES # BLD AUTO: 0.6 10E3/UL (ref 0–1.3)
MONOCYTES NFR BLD AUTO: 6 %
NEUTROPHILS # BLD AUTO: 7.3 10E3/UL (ref 1.6–8.3)
NEUTROPHILS NFR BLD AUTO: 82 %
NRBC # BLD AUTO: 0 10E3/UL
NRBC BLD AUTO-RTO: 0 /100
PLATELET # BLD AUTO: 208 10E3/UL (ref 150–450)
RBC # BLD AUTO: 4.1 10E6/UL (ref 3.8–5.2)
WBC # BLD AUTO: 9 10E3/UL (ref 4–11)

## 2024-09-02 PROCEDURE — 36415 COLL VENOUS BLD VENIPUNCTURE: CPT | Performed by: EMERGENCY MEDICINE

## 2024-09-02 PROCEDURE — 99285 EMERGENCY DEPT VISIT HI MDM: CPT | Performed by: EMERGENCY MEDICINE

## 2024-09-02 PROCEDURE — 83880 ASSAY OF NATRIURETIC PEPTIDE: CPT | Performed by: EMERGENCY MEDICINE

## 2024-09-02 PROCEDURE — 84484 ASSAY OF TROPONIN QUANT: CPT | Performed by: EMERGENCY MEDICINE

## 2024-09-02 PROCEDURE — 83735 ASSAY OF MAGNESIUM: CPT | Performed by: EMERGENCY MEDICINE

## 2024-09-02 PROCEDURE — 93010 ELECTROCARDIOGRAM REPORT: CPT | Performed by: EMERGENCY MEDICINE

## 2024-09-02 PROCEDURE — 258N000003 HC RX IP 258 OP 636: Performed by: EMERGENCY MEDICINE

## 2024-09-02 PROCEDURE — 96375 TX/PRO/DX INJ NEW DRUG ADDON: CPT | Performed by: EMERGENCY MEDICINE

## 2024-09-02 PROCEDURE — 83605 ASSAY OF LACTIC ACID: CPT | Performed by: EMERGENCY MEDICINE

## 2024-09-02 PROCEDURE — 93005 ELECTROCARDIOGRAM TRACING: CPT | Performed by: EMERGENCY MEDICINE

## 2024-09-02 PROCEDURE — 85610 PROTHROMBIN TIME: CPT | Performed by: EMERGENCY MEDICINE

## 2024-09-02 PROCEDURE — 99285 EMERGENCY DEPT VISIT HI MDM: CPT | Mod: 25 | Performed by: EMERGENCY MEDICINE

## 2024-09-02 PROCEDURE — 96366 THER/PROPH/DIAG IV INF ADDON: CPT | Performed by: EMERGENCY MEDICINE

## 2024-09-02 PROCEDURE — 82040 ASSAY OF SERUM ALBUMIN: CPT | Performed by: EMERGENCY MEDICINE

## 2024-09-02 PROCEDURE — 250N000011 HC RX IP 250 OP 636: Performed by: EMERGENCY MEDICINE

## 2024-09-02 PROCEDURE — 85730 THROMBOPLASTIN TIME PARTIAL: CPT | Performed by: EMERGENCY MEDICINE

## 2024-09-02 PROCEDURE — 85025 COMPLETE CBC W/AUTO DIFF WBC: CPT | Performed by: EMERGENCY MEDICINE

## 2024-09-02 RX ORDER — MORPHINE SULFATE 4 MG/ML
4 INJECTION, SOLUTION INTRAMUSCULAR; INTRAVENOUS ONCE
Status: COMPLETED | OUTPATIENT
Start: 2024-09-02 | End: 2024-09-02

## 2024-09-02 RX ORDER — AMPICILLIN AND SULBACTAM 2; 1 G/1; G/1
3 INJECTION, POWDER, FOR SOLUTION INTRAMUSCULAR; INTRAVENOUS ONCE
Status: COMPLETED | OUTPATIENT
Start: 2024-09-02 | End: 2024-09-03

## 2024-09-02 RX ORDER — ACETAMINOPHEN 500 MG
1000 TABLET ORAL ONCE
Status: COMPLETED | OUTPATIENT
Start: 2024-09-02 | End: 2024-09-03

## 2024-09-02 RX ORDER — MAGNESIUM HYDROXIDE/ALUMINUM HYDROXICE/SIMETHICONE 120; 1200; 1200 MG/30ML; MG/30ML; MG/30ML
30 SUSPENSION ORAL ONCE
Status: COMPLETED | OUTPATIENT
Start: 2024-09-02 | End: 2024-09-03

## 2024-09-02 RX ORDER — ONDANSETRON 2 MG/ML
4 INJECTION INTRAMUSCULAR; INTRAVENOUS ONCE
Status: COMPLETED | OUTPATIENT
Start: 2024-09-02 | End: 2024-09-03

## 2024-09-02 RX ORDER — METOCLOPRAMIDE HYDROCHLORIDE 5 MG/ML
10 INJECTION INTRAMUSCULAR; INTRAVENOUS ONCE
Status: COMPLETED | OUTPATIENT
Start: 2024-09-02 | End: 2024-09-02

## 2024-09-02 RX ORDER — KETOROLAC TROMETHAMINE 15 MG/ML
7.5 INJECTION, SOLUTION INTRAMUSCULAR; INTRAVENOUS ONCE
Status: COMPLETED | OUTPATIENT
Start: 2024-09-02 | End: 2024-09-03

## 2024-09-02 RX ADMIN — SODIUM CHLORIDE, POTASSIUM CHLORIDE, SODIUM LACTATE AND CALCIUM CHLORIDE 1000 ML: 600; 310; 30; 20 INJECTION, SOLUTION INTRAVENOUS at 22:40

## 2024-09-02 RX ADMIN — METOCLOPRAMIDE HYDROCHLORIDE 10 MG: 5 INJECTION INTRAMUSCULAR; INTRAVENOUS at 22:39

## 2024-09-02 RX ADMIN — MORPHINE SULFATE 4 MG: 4 INJECTION INTRAVENOUS at 22:39

## 2024-09-02 ASSESSMENT — ACTIVITIES OF DAILY LIVING (ADL)
ADLS_ACUITY_SCORE: 33
ADLS_ACUITY_SCORE: 35

## 2024-09-02 ASSESSMENT — COLUMBIA-SUICIDE SEVERITY RATING SCALE - C-SSRS
2. HAVE YOU ACTUALLY HAD ANY THOUGHTS OF KILLING YOURSELF IN THE PAST MONTH?: NO
1. IN THE PAST MONTH, HAVE YOU WISHED YOU WERE DEAD OR WISHED YOU COULD GO TO SLEEP AND NOT WAKE UP?: NO
6. HAVE YOU EVER DONE ANYTHING, STARTED TO DO ANYTHING, OR PREPARED TO DO ANYTHING TO END YOUR LIFE?: NO

## 2024-09-03 ENCOUNTER — APPOINTMENT (OUTPATIENT)
Dept: CT IMAGING | Facility: CLINIC | Age: 74
DRG: 394 | End: 2024-09-03
Attending: EMERGENCY MEDICINE

## 2024-09-03 ENCOUNTER — APPOINTMENT (OUTPATIENT)
Dept: GENERAL RADIOLOGY | Facility: CLINIC | Age: 74
DRG: 394 | End: 2024-09-03
Attending: EMERGENCY MEDICINE

## 2024-09-03 ENCOUNTER — VIRTUAL VISIT (OUTPATIENT)
Dept: INTERPRETER SERVICES | Facility: CLINIC | Age: 74
DRG: 394 | End: 2024-09-03
Payer: COMMERCIAL

## 2024-09-03 VITALS
RESPIRATION RATE: 16 BRPM | SYSTOLIC BLOOD PRESSURE: 99 MMHG | TEMPERATURE: 99.7 F | OXYGEN SATURATION: 97 % | WEIGHT: 142 LBS | BODY MASS INDEX: 27.88 KG/M2 | HEIGHT: 60 IN | HEART RATE: 62 BPM | DIASTOLIC BLOOD PRESSURE: 65 MMHG

## 2024-09-03 PROBLEM — K56.600 PARTIAL SMALL BOWEL OBSTRUCTION (H): Status: ACTIVE | Noted: 2024-09-03

## 2024-09-03 PROBLEM — L03.211 FACIAL CELLULITIS: Status: ACTIVE | Noted: 2024-09-03

## 2024-09-03 PROBLEM — R51.9 NONINTRACTABLE HEADACHE, UNSPECIFIED CHRONICITY PATTERN, UNSPECIFIED HEADACHE TYPE: Status: ACTIVE | Noted: 2024-09-03

## 2024-09-03 LAB
ALBUMIN SERPL BCG-MCNC: 4.2 G/DL (ref 3.5–5.2)
ALBUMIN UR-MCNC: 10 MG/DL
ALP SERPL-CCNC: 86 U/L (ref 40–150)
ALT SERPL W P-5'-P-CCNC: 10 U/L (ref 0–50)
ANION GAP SERPL CALCULATED.3IONS-SCNC: 14 MMOL/L (ref 7–15)
ANION GAP SERPL CALCULATED.3IONS-SCNC: 8 MMOL/L (ref 7–15)
APPEARANCE UR: CLEAR
AST SERPL W P-5'-P-CCNC: 23 U/L (ref 0–45)
ATRIAL RATE - MUSE: 71 BPM
BILIRUB SERPL-MCNC: 0.6 MG/DL
BILIRUB UR QL STRIP: NEGATIVE
BUN SERPL-MCNC: 6.8 MG/DL (ref 8–23)
BUN SERPL-MCNC: 7.5 MG/DL (ref 8–23)
CALCIUM SERPL-MCNC: 8.5 MG/DL (ref 8.8–10.4)
CALCIUM SERPL-MCNC: 9.2 MG/DL (ref 8.8–10.4)
CHLORIDE SERPL-SCNC: 101 MMOL/L (ref 98–107)
CHLORIDE SERPL-SCNC: 106 MMOL/L (ref 98–107)
COLOR UR AUTO: ABNORMAL
CREAT SERPL-MCNC: 0.63 MG/DL (ref 0.51–0.95)
CREAT SERPL-MCNC: 0.66 MG/DL (ref 0.51–0.95)
DIASTOLIC BLOOD PRESSURE - MUSE: NORMAL MMHG
EGFRCR SERPLBLD CKD-EPI 2021: >90 ML/MIN/1.73M2
EGFRCR SERPLBLD CKD-EPI 2021: >90 ML/MIN/1.73M2
ERYTHROCYTE [DISTWIDTH] IN BLOOD BY AUTOMATED COUNT: 12.9 % (ref 10–15)
GLUCOSE SERPL-MCNC: 115 MG/DL (ref 70–99)
GLUCOSE SERPL-MCNC: 143 MG/DL (ref 70–99)
GLUCOSE UR STRIP-MCNC: NEGATIVE MG/DL
HCO3 SERPL-SCNC: 23 MMOL/L (ref 22–29)
HCO3 SERPL-SCNC: 26 MMOL/L (ref 22–29)
HCT VFR BLD AUTO: 32.6 % (ref 35–47)
HGB BLD-MCNC: 11.1 G/DL (ref 11.7–15.7)
HGB UR QL STRIP: ABNORMAL
INTERPRETATION ECG - MUSE: NORMAL
KETONES UR STRIP-MCNC: 10 MG/DL
LEUKOCYTE ESTERASE UR QL STRIP: NEGATIVE
MAGNESIUM SERPL-MCNC: 2 MG/DL (ref 1.7–2.3)
MCH RBC QN AUTO: 30.7 PG (ref 26.5–33)
MCHC RBC AUTO-ENTMCNC: 34 G/DL (ref 31.5–36.5)
MCV RBC AUTO: 90 FL (ref 78–100)
NITRATE UR QL: NEGATIVE
NT-PROBNP SERPL-MCNC: 250 PG/ML (ref 0–900)
P AXIS - MUSE: 51 DEGREES
PH UR STRIP: 8 [PH] (ref 5–7)
PLATELET # BLD AUTO: 182 10E3/UL (ref 150–450)
POTASSIUM SERPL-SCNC: 3.6 MMOL/L (ref 3.4–5.3)
POTASSIUM SERPL-SCNC: 3.7 MMOL/L (ref 3.4–5.3)
PR INTERVAL - MUSE: 196 MS
PROT SERPL-MCNC: 7.8 G/DL (ref 6.4–8.3)
QRS DURATION - MUSE: 80 MS
QT - MUSE: 414 MS
QTC - MUSE: 449 MS
R AXIS - MUSE: -12 DEGREES
RBC # BLD AUTO: 3.62 10E6/UL (ref 3.8–5.2)
RBC URINE: 1 /HPF
SODIUM SERPL-SCNC: 138 MMOL/L (ref 135–145)
SODIUM SERPL-SCNC: 140 MMOL/L (ref 135–145)
SP GR UR STRIP: >1.05 (ref 1–1.03)
SQUAMOUS EPITHELIAL: 1 /HPF
SYSTOLIC BLOOD PRESSURE - MUSE: NORMAL MMHG
T AXIS - MUSE: 29 DEGREES
TROPONIN T SERPL HS-MCNC: 6 NG/L
UROBILINOGEN UR STRIP-MCNC: NORMAL MG/DL
VENTRICULAR RATE- MUSE: 71 BPM
WBC # BLD AUTO: 7.5 10E3/UL (ref 4–11)
WBC URINE: 2 /HPF

## 2024-09-03 PROCEDURE — 250N000013 HC RX MED GY IP 250 OP 250 PS 637: Performed by: EMERGENCY MEDICINE

## 2024-09-03 PROCEDURE — 81001 URINALYSIS AUTO W/SCOPE: CPT | Performed by: EMERGENCY MEDICINE

## 2024-09-03 PROCEDURE — 120N000002 HC R&B MED SURG/OB UMMC

## 2024-09-03 PROCEDURE — 250N000009 HC RX 250

## 2024-09-03 PROCEDURE — 99235 HOSP IP/OBS SAME DATE MOD 70: CPT | Mod: GC

## 2024-09-03 PROCEDURE — 250N000009 HC RX 250: Performed by: EMERGENCY MEDICINE

## 2024-09-03 PROCEDURE — 96361 HYDRATE IV INFUSION ADD-ON: CPT | Performed by: EMERGENCY MEDICINE

## 2024-09-03 PROCEDURE — 96375 TX/PRO/DX INJ NEW DRUG ADDON: CPT | Performed by: EMERGENCY MEDICINE

## 2024-09-03 PROCEDURE — 74177 CT ABD & PELVIS W/CONTRAST: CPT

## 2024-09-03 PROCEDURE — 96365 THER/PROPH/DIAG IV INF INIT: CPT | Performed by: EMERGENCY MEDICINE

## 2024-09-03 PROCEDURE — T1013 SIGN LANG/ORAL INTERPRETER: HCPCS | Mod: GT,TEL,95

## 2024-09-03 PROCEDURE — 250N000013 HC RX MED GY IP 250 OP 250 PS 637

## 2024-09-03 PROCEDURE — 71046 X-RAY EXAM CHEST 2 VIEWS: CPT

## 2024-09-03 PROCEDURE — 258N000003 HC RX IP 258 OP 636

## 2024-09-03 PROCEDURE — 85027 COMPLETE CBC AUTOMATED: CPT

## 2024-09-03 PROCEDURE — 250N000011 HC RX IP 250 OP 636: Performed by: EMERGENCY MEDICINE

## 2024-09-03 PROCEDURE — 250N000011 HC RX IP 250 OP 636

## 2024-09-03 PROCEDURE — 80048 BASIC METABOLIC PNL TOTAL CA: CPT

## 2024-09-03 PROCEDURE — 70487 CT MAXILLOFACIAL W/DYE: CPT

## 2024-09-03 PROCEDURE — 36415 COLL VENOUS BLD VENIPUNCTURE: CPT

## 2024-09-03 RX ORDER — LEVOFLOXACIN 750 MG/1
750 TABLET, FILM COATED ORAL DAILY
Qty: 10 TABLET | Refills: 0 | Status: SHIPPED | OUTPATIENT
Start: 2024-09-03 | End: 2024-09-13

## 2024-09-03 RX ORDER — LIDOCAINE 40 MG/G
CREAM TOPICAL
Status: DISCONTINUED | OUTPATIENT
Start: 2024-09-03 | End: 2024-09-03 | Stop reason: HOSPADM

## 2024-09-03 RX ORDER — ONDANSETRON 4 MG/1
4 TABLET, ORALLY DISINTEGRATING ORAL EVERY 6 HOURS PRN
Status: DISCONTINUED | OUTPATIENT
Start: 2024-09-03 | End: 2024-09-03 | Stop reason: HOSPADM

## 2024-09-03 RX ORDER — METRONIDAZOLE 500 MG/1
500 TABLET ORAL 3 TIMES DAILY
Qty: 30 TABLET | Refills: 0 | Status: SHIPPED | OUTPATIENT
Start: 2024-09-03 | End: 2024-09-13

## 2024-09-03 RX ORDER — POLYETHYLENE GLYCOL 3350 17 G/17G
17 POWDER, FOR SOLUTION ORAL 2 TIMES DAILY PRN
Status: DISCONTINUED | OUTPATIENT
Start: 2024-09-03 | End: 2024-09-03 | Stop reason: HOSPADM

## 2024-09-03 RX ORDER — ACETAMINOPHEN 650 MG/1
650 SUPPOSITORY RECTAL EVERY 4 HOURS PRN
Status: DISCONTINUED | OUTPATIENT
Start: 2024-09-03 | End: 2024-09-03 | Stop reason: HOSPADM

## 2024-09-03 RX ORDER — MAGNESIUM HYDROXIDE/ALUMINUM HYDROXICE/SIMETHICONE 120; 1200; 1200 MG/30ML; MG/30ML; MG/30ML
30 SUSPENSION ORAL EVERY 4 HOURS PRN
Status: DISCONTINUED | OUTPATIENT
Start: 2024-09-03 | End: 2024-09-03 | Stop reason: HOSPADM

## 2024-09-03 RX ORDER — ONDANSETRON 2 MG/ML
4 INJECTION INTRAMUSCULAR; INTRAVENOUS EVERY 6 HOURS PRN
Status: DISCONTINUED | OUTPATIENT
Start: 2024-09-03 | End: 2024-09-03 | Stop reason: HOSPADM

## 2024-09-03 RX ORDER — PROCHLORPERAZINE 25 MG
12.5 SUPPOSITORY, RECTAL RECTAL EVERY 12 HOURS PRN
Status: DISCONTINUED | OUTPATIENT
Start: 2024-09-03 | End: 2024-09-03 | Stop reason: HOSPADM

## 2024-09-03 RX ORDER — SODIUM CHLORIDE 9 MG/ML
INJECTION, SOLUTION INTRAVENOUS CONTINUOUS
Status: DISCONTINUED | OUTPATIENT
Start: 2024-09-03 | End: 2024-09-03 | Stop reason: HOSPADM

## 2024-09-03 RX ORDER — CEFAZOLIN SODIUM 2 G/100ML
2 INJECTION, SOLUTION INTRAVENOUS EVERY 8 HOURS
Status: DISCONTINUED | OUTPATIENT
Start: 2024-09-03 | End: 2024-09-03 | Stop reason: HOSPADM

## 2024-09-03 RX ORDER — IOPAMIDOL 755 MG/ML
100 INJECTION, SOLUTION INTRAVASCULAR ONCE
Status: COMPLETED | OUTPATIENT
Start: 2024-09-03 | End: 2024-09-03

## 2024-09-03 RX ORDER — HYDROMORPHONE HCL IN WATER/PF 6 MG/30 ML
0.2 PATIENT CONTROLLED ANALGESIA SYRINGE INTRAVENOUS EVERY 4 HOURS PRN
Status: DISCONTINUED | OUTPATIENT
Start: 2024-09-03 | End: 2024-09-03 | Stop reason: HOSPADM

## 2024-09-03 RX ORDER — ACETAMINOPHEN 325 MG/1
650 TABLET ORAL EVERY 4 HOURS PRN
Status: DISCONTINUED | OUTPATIENT
Start: 2024-09-03 | End: 2024-09-03 | Stop reason: HOSPADM

## 2024-09-03 RX ORDER — KETOROLAC TROMETHAMINE 15 MG/ML
15 INJECTION, SOLUTION INTRAMUSCULAR; INTRAVENOUS EVERY 6 HOURS PRN
Status: DISCONTINUED | OUTPATIENT
Start: 2024-09-03 | End: 2024-09-03 | Stop reason: HOSPADM

## 2024-09-03 RX ORDER — PROCHLORPERAZINE MALEATE 5 MG
5 TABLET ORAL EVERY 6 HOURS PRN
Status: DISCONTINUED | OUTPATIENT
Start: 2024-09-03 | End: 2024-09-03 | Stop reason: HOSPADM

## 2024-09-03 RX ADMIN — ONDANSETRON 4 MG: 2 INJECTION INTRAMUSCULAR; INTRAVENOUS at 00:08

## 2024-09-03 RX ADMIN — PANTOPRAZOLE SODIUM 40 MG: 40 INJECTION, POWDER, FOR SOLUTION INTRAVENOUS at 08:27

## 2024-09-03 RX ADMIN — AMPICILLIN SODIUM AND SULBACTAM SODIUM 3 G: 2; 1 INJECTION, POWDER, FOR SOLUTION INTRAMUSCULAR; INTRAVENOUS at 00:08

## 2024-09-03 RX ADMIN — SODIUM CHLORIDE 34 ML: 9 INJECTION, SOLUTION INTRAVENOUS at 01:37

## 2024-09-03 RX ADMIN — KETOROLAC TROMETHAMINE 7.5 MG: 15 INJECTION, SOLUTION INTRAMUSCULAR; INTRAVENOUS at 00:08

## 2024-09-03 RX ADMIN — IOPAMIDOL 69 ML: 755 INJECTION, SOLUTION INTRAVENOUS at 01:36

## 2024-09-03 RX ADMIN — SODIUM CHLORIDE: 9 INJECTION, SOLUTION INTRAVENOUS at 06:03

## 2024-09-03 RX ADMIN — CEFAZOLIN SODIUM 2 G: 2 INJECTION, SOLUTION INTRAVENOUS at 06:08

## 2024-09-03 RX ADMIN — ONDANSETRON 4 MG: 4 TABLET, ORALLY DISINTEGRATING ORAL at 18:07

## 2024-09-03 RX ADMIN — ACETAMINOPHEN 650 MG: 325 TABLET ORAL at 18:08

## 2024-09-03 RX ADMIN — ACETAMINOPHEN 650 MG: 325 TABLET ORAL at 11:29

## 2024-09-03 RX ADMIN — FAMOTIDINE 20 MG: 10 INJECTION, SOLUTION INTRAVENOUS at 00:08

## 2024-09-03 RX ADMIN — CEFAZOLIN SODIUM 2 G: 2 INJECTION, SOLUTION INTRAVENOUS at 13:31

## 2024-09-03 RX ADMIN — ACETAMINOPHEN 1000 MG: 500 TABLET ORAL at 00:11

## 2024-09-03 RX ADMIN — ALUMINUM HYDROXIDE, MAGNESIUM HYDROXIDE, AND SIMETHICONE 30 ML: 1200; 120; 1200 SUSPENSION ORAL at 00:08

## 2024-09-03 ASSESSMENT — ACTIVITIES OF DAILY LIVING (ADL)
ADLS_ACUITY_SCORE: 35
ADLS_ACUITY_SCORE: 35
ADLS_ACUITY_SCORE: 36
ADLS_ACUITY_SCORE: 35
ADLS_ACUITY_SCORE: 36
ADLS_ACUITY_SCORE: 39
ADLS_ACUITY_SCORE: 36
ADLS_ACUITY_SCORE: 35
ADLS_ACUITY_SCORE: 35
ADLS_ACUITY_SCORE: 36
ADLS_ACUITY_SCORE: 35
ADLS_ACUITY_SCORE: 35
ADLS_ACUITY_SCORE: 36
ADLS_ACUITY_SCORE: 36
ADLS_ACUITY_SCORE: 35
ADLS_ACUITY_SCORE: 39
ADLS_ACUITY_SCORE: 35
ADLS_ACUITY_SCORE: 39
ADLS_ACUITY_SCORE: 35
ADLS_ACUITY_SCORE: 36
ADLS_ACUITY_SCORE: 35

## 2024-09-03 NOTE — PLAN OF CARE
Goal Outcome Evaluation:  6MS ADMISSION    D: Patient admitted/transferred from  ED via Bed for Partial small bowel obstruction.     I: Upon arrival to the unit patient was oriented to room, unit, and call light. Patient s height, weight, and vital signs were obtained. Allergies reviewed and allergy band applied. Provider notified of patient s arrival on the unit. Adult AVS completed. Head to toe assessment completed. Education assessment completed. Care plan initiated.    A: Vital signs stable upon admission. Patient rates pain at 0/10. Two RN skin assessment completed KELSEA Martinez. Second RN was PROSPER Koch. Significant Skin Findings include None. [facility] Nurse Consult Ordered No . Bed Algorithm can be found in PCS flow sheets (Support Surface Algorithm) and on IP [facility] NURSE RESOURCE TAB, was this used during this assessment? no Was a bariatric bed frame ordered? no. Was an air pump added to the Isoflex mattress? no    P: Continue to monitor patient s                       and intervene as needed. Continue with plan of care. Notify provider with any concerns or changes in patient status.

## 2024-09-03 NOTE — ED PROVIDER NOTES
[facility] EMERGENCY DEPARTMENT ([facility])    9/02/24       ED PROVIDER NOTE    History     Chief Complaint   Patient presents with    Dental Pain     Patient presents due to swelling and pain to left upper jaw due to broken tooth; accompanied by vomiting.     HPI  Byron Jasmine is a 74 year old female who has had poor dentition for many years and has had increased dental pain in 2 upper chronically fractured teeth since Friday x 3 days. Patient reports localized swelling in upper lip. Denies fever but reports abdominal discomfort and vomiting today.    A complete review of systems was performed with pertinent positives and negatives noted in the HPI, and all other systems negative.    Physical Exam   BP: 114/62  Pulse: 74  Temp: 97.1  F (36.2  C)  Resp: 16  Height: 152.4 cm (5')  Weight: 64.4 kg (142 lb)  SpO2: 100 %  Physical Exam  Patient appears in pain and nauseous   Oral exam with poor dentition and multiple chronically fractured teeth   Tongue within normal limits no stridor uvula midline upper lip mildly swollen no gingival fluctuance   Abdominal exam soft nontender nondistended   Lungs clear   Heart regular rate and rhythm    ED Course, Procedures, & Data      Procedures            EKG Interpretation:      Interpreted by Loyd Roberts MD  Time reviewed: 2225  Symptoms at time of EKG: Headache, chest pain  Rhythm: normal sinus   Rate: 71 bpm  Axis: Normal  ST Segments/ T Waves: No acute ischemic changes    Clinical Impression: Normal sinus rhythm without acute ischemia                 Results for orders placed or performed during the hospital encounter of 09/02/24   INR     Status: Normal   Result Value Ref Range    INR 1.06 0.85 - 1.15   Partial thromboplastin time     Status: Normal   Result Value Ref Range    aPTT 28 22 - 38 Seconds   Lactic acid whole blood with 1x repeat in 2 hr when >2     Status: Normal   Result Value Ref Range    Lactic Acid, Initial 1.5 0.7 - 2.0 mmol/L   CBC with  platelets and differential     Status: None   Result Value Ref Range    WBC Count 9.0 4.0 - 11.0 10e3/uL    RBC Count 4.10 3.80 - 5.20 10e6/uL    Hemoglobin 12.5 11.7 - 15.7 g/dL    Hematocrit 36.9 35.0 - 47.0 %    MCV 90 78 - 100 fL    MCH 30.5 26.5 - 33.0 pg    MCHC 33.9 31.5 - 36.5 g/dL    RDW 12.6 10.0 - 15.0 %    Platelet Count 208 150 - 450 10e3/uL    % Neutrophils 82 %    % Lymphocytes 11 %    % Monocytes 6 %    % Eosinophils 0 %    % Basophils 0 %    % Immature Granulocytes 1 %    NRBCs per 100 WBC 0 <1 /100    Absolute Neutrophils 7.3 1.6 - 8.3 10e3/uL    Absolute Lymphocytes 1.0 0.8 - 5.3 10e3/uL    Absolute Monocytes 0.6 0.0 - 1.3 10e3/uL    Absolute Eosinophils 0.0 0.0 - 0.7 10e3/uL    Absolute Basophils 0.0 0.0 - 0.2 10e3/uL    Absolute Immature Granulocytes 0.1 <=0.4 10e3/uL    Absolute NRBCs 0.0 10e3/uL   CBC with platelets differential     Status: None    Narrative    The following orders were created for panel order CBC with platelets differential.  Procedure                               Abnormality         Status                     ---------                               -----------         ------                     CBC with platelets and d...[[id number]]                      Final result                 Please view results for these tests on the individual orders.     Medications   lactated ringers BOLUS 1,000 mL (1,000 mLs Intravenous $New Bag 9/2/24 2240)   ampicillin-sulbactam (UNASYN) 3 g vial to attach to  mL bag (has no administration in time range)   pharmacy alert - intermittent dosing (has no administration in time range)   morphine (PF) injection 4 mg (4 mg Intravenous $Given 9/2/24 2239)   metoclopramide (REGLAN) injection 10 mg (10 mg Intravenous $Given 9/2/24 2239)     Labs Ordered and Resulted from Time of ED Arrival to Time of ED Departure   INR - Normal       Result Value    INR 1.06     PARTIAL THROMBOPLASTIN TIME - Normal    aPTT 28     LACTIC ACID WHOLE BLOOD WITH 1X  REPEAT IN 2 HR WHEN >2 - Normal    Lactic Acid, Initial 1.5     CBC WITH PLATELETS AND DIFFERENTIAL    WBC Count 9.0      RBC Count 4.10      Hemoglobin 12.5      Hematocrit 36.9      MCV 90      MCH 30.5      MCHC 33.9      RDW 12.6      Platelet Count 208      % Neutrophils 82      % Lymphocytes 11      % Monocytes 6      % Eosinophils 0      % Basophils 0      % Immature Granulocytes 1      NRBCs per 100 WBC 0      Absolute Neutrophils 7.3      Absolute Lymphocytes 1.0      Absolute Monocytes 0.6      Absolute Eosinophils 0.0      Absolute Basophils 0.0      Absolute Immature Granulocytes 0.1      Absolute NRBCs 0.0     COMPREHENSIVE METABOLIC PANEL   TROPONIN T, HIGH SENSITIVITY   MAGNESIUM   NT PROBNP INPATIENT   ROUTINE UA WITH MICROSCOPIC REFLEX TO CULTURE     XR Chest 2 Views    (Results Pending)   CT Abdomen Pelvis w Contrast    (Results Pending)   CT Facial Bones with Contrast    (Results Pending)          Critical care was not performed.     Medical Decision Making  The patient's presentation was of high complexity (an acute health issue posing potential threat to life or bodily function).    The patient's evaluation involved:  ordering and/or review of 2 test(s) in this encounter (see separate area of note for details)  independent interpretation of testing performed by another health professional (cxr)    The patient's management necessitated high risk (a parenteral controlled substance).    Assessment & Plan    Dental pain with poor dentition with concern for upper dental abscesses versus early infection. Will treat with antibiotics. Will screen for deep facial abscesses. Will also screen for intra-abdominal infections, small bowel obstructions, or abscesses. Differential diagnoses also includes pneumonia, atypical presentation of cardiac ischemia, or cardiac arrhythmia pain and nausea medicines via IV. EKG will reassess.    11:45 PM -patient with somewhat improved pain, though feels like she is having  reflux.  Will treat with Maalox, Pepcid.  Continued mild her pain will be treated with low-dose IV Toradol as well as acetaminophen.  Zofran for nausea prophylactically.  Still pending CT    I have reviewed the nursing notes. I have reviewed the findings, diagnosis, plan and need for follow up with the patient.    New Prescriptions    No medications on file       Final diagnoses:   Pain, dental   Generalized abdominal pain   Nausea and vomiting, unspecified vomiting type   Nonintractable headache, unspecified chronicity pattern, unspecified headache type       Loyd Roberts MD  [facility] EMERGENCY DEPARTMENT  9/2/2024     Loyd Roberts MD  09/02/24 9900

## 2024-09-03 NOTE — CONSULTS
"Asked to see re: possible SBO noted on CT.  Presently patient is complaining of dental pain.  Denies any abdominal pain or discomfort.  Is hungry and not nauseated.  No significant bowel movement since admission.  Past medical history and surgical history reviewed and noted in the chart.  Status post open cholecystectomy sometime ago.  No history of bowel obstructions per patient's family member who served as the  on today's visit.  Exam:  Afebrile vital signs stable.  Abdomen soft nontender nondistended.  Infraumbilical hernia noted soft and nontender and reducible.  Laboratory studies all within normal limits except for elevated glucose.  CT scan is reviewed and noted.  Impression: 74-year-old woman presently without signs or symptoms of bowel obstruction.  Agree hernia should be addressed at some point in time however would optimize as an elective surgery.  Recommend start p.o.'s if tolerated okay for follow-up as an outpatient with me in surgery clinic.  Agree with aggressive treatment of dental issues as would have to resolve any sort of infection before elective repair of the hernia could be completed.  If does not tolerate p.o.'s today then would proceed with Gastrografin challenge as noted on previous plan.  Today's interview and exam was conducted with the patient's son who served as  he understood my recommendations and plan and knows that they will be seeing me in the surgery clinic on an elective basis.  \"Total time = 30 minutes, spent on the date of encounter doing chart review, history and physical, documentation, patient education, and any further activity as noted above.     "

## 2024-09-03 NOTE — ED PROVIDER NOTES
Patient received in signout from Dr. Roberts.  See his note for further documentation.  Patient presented to the ED for evaluation of left upper jaw/dental pain as well as abdominal pain nausea and vomiting.  Plan on signout is to follow-up with facial/abdominal imaging and disposition accordingly.    CT Facial Bones with Contrast   Final Result   IMPRESSION:    1.  Exam is limited by significant beam hardening artifact from the patient's dental amalgam.   2.  Large cavity in the first left maxillary molar, with overlying stranding in the left buccal space, suggestive of early cellulitis. No evidence of an organized drainable fluid collection.      CT Abdomen Pelvis w Contrast   Final Result   IMPRESSION:    1.  Small bowel and fat-containing ventral hernia with myofascial defect measuring 2.6 x 4.5 cm. Mild fluid-filled distention/mild dilatation of small bowel loops that extend into this hernia. This could indicate a partial obstruction. No evidence of    bowel wall thickening or inflammatory change. Consider follow-up Gastrografin challenge to assess for contrast passage through the small bowel in this area.      2.  Colonic diverticulosis without diverticulitis.      3.  Mild superior and inferior endplate compression L1, age indeterminate.      XR Chest 2 Views   Final Result   IMPRESSION: Stable opacification involving the anterior aspect of the left lower lung, likely representing chronic atelectasis. Left upper lung clear. Right lung clear. No pneumothorax or pleural fluid. Normal heart size and pulmonary vascularity.    Tortuous aorta. Minimal degenerative hypertrophic changes in the spine. Marked degenerative changes both glenohumeral joints.           CT facial bones shows a large dental carry in the area of concern with some surrounding inflammation concerning for facial cellulitis.  Patient was started on Unasyn and by the prior provider.  CT of the abdomen shows a small bowel fat-containing ventral  hernia with a 4 cm myofascial defect.  There is also some fluid-filled distention/mild dilation which could indicate a small bowel obstruction.  On my reassessment, abdomen is soft/benign.  Hernia is easily read reducible.  Do not feel that she needs emergent surgical consultation.  Her vomiting has improved with IV antiemetics.  Will hold off on nasogastric decompression at this time.  Plan for admission to the hospital.  Discussed with the hospitalist and plan to keep patient on the [place].     Carlos Manuel Blackmon,   09/03/24 0351

## 2024-09-03 NOTE — PLAN OF CARE
Goal Outcome Evaluation:       Patient is A&O x4; [place] speaking. Patient denied chest pain/SOB and dizziness/nausea. Patient c/o oral pain and headache; PRN Tylenol given. Patient is continent of both bowel and bladder. Family at bedside    Pt. discharged at 6:30pm to home w/ son, and left with personal belongings. Pt. received complete discharge paperwork. Pt. was given times of last dose for all discharge medications in writing on discharge medication sheets. Discharge teaching included antibiotic medication, pain management, and dental care. Pt. had no further questions at the time of discharge and no unmet needs were identified.

## 2024-09-03 NOTE — H&P
[facility]    History and Physical - [facility]       Date of Admission:  9/2/2024    Assessment & Plan     Byron Jasmine is a 74 year old female with a PMH of poor dentition and multi-joint osteoarthritis admitted on 9/2/2024 for partial small bowel obstruction 2/2 non-incarcerated ventral hernia.    # Partial Small Bowel Obstruction  # Ventral Hernia, non-incarcerated  # GERD  Initially presented to ED for dental pain, abdominal discomfort, and vomiting. In ED, was hemodynamically stable with largely normal CMP, CBC, lactate, UA, EKG, trops, BNP. Abdominal pain & N/V improved with multimodal approach including maalox, pepcid, toradol, reglan, morphine, zofran but CT results returned with evidence of small bowel and fat-containing ventral hernia & some mild fluid distention of bowel loops present in the hernia, indicating likely partial obstruction. General surgery was paged regarding determination for need of surgical intervention, but as she has improved with medical therapy alone, the triage and ED team felt it unlikely that she would require urgent surgical intervention. As such, she will be admitted to the [facility] medical team for medical management of partial SBO, abdominal pain, N/V, & general surgery consultation.  - General surgery consulted, recs appreciated   - NPO for now pending gen surg recs  - NGT decompression not needed in ED, but low threshold to initiate should symptoms persist, worsen, or she be unable to tolerate diet advancement  - Pantoprazole 40mg IV qAM  - Pepcid 20mg IV PRN  - Maalox PRN  - mIVF NS 75mL/h while NPO  - Pain: Tylenol PRN; Toradol 15mg q6h PRN IV, dilaudid 0.2mg q4h PRN IV  - Nausea: Zofran, Compazine PRN  - Constipation: Miralax PRN  - Labs: CMP, CBC daily  - Nursing: Vitals q8h    # Periodontal cellulitis without abscess  CT facial bones obtained in ED with evidence of early cellulitis in the left  buccal space overlying the first maxillary molar but without abscess formation. Has remained vitally stable without evidence of sepsis, normal lactate. Do not anticipate need for procedural/surgical intervention at this time & will proceed with treatment of presumed cellulitis.  - Cefazolin 2g IV q8h  - Inpatient dental consult placed, recs appreciated  - Consider OMFS consult if concerned for acute worsening    # Osteoarthritis of multiple joints  Chronic, long-standing, not on any long-term medications. Pain control as above. Could consider outpatient PT referral on discharge.     Diet: NPO per Anesthesia Guidelines for Procedure/Surgery Except for: Meds  DVT Prophylaxis: Ambulate every shift  Che Catheter: Not present  Fluids: 75ml/h NS while NPO  Lines: None     Cardiac Monitoring: None  Code Status: Full Code    Clinically Significant Risk Factors Present on Admission                          # Overweight: Estimated body mass index is 27.73 kg/m  as calculated from the following:    Height as of this encounter: 1.524 m (5').    Weight as of this encounter: 64.4 kg (142 lb).                    Disposition Plan      Expected Discharge Date: 09/04/2024                The patient's care was discussed with the Attending Physician, Dr. Martinez.    DO [facility]  [facility]  Securely message with [employer] (more info)  Text page via [facility] Paging/Directory   See signed in provider for up to date coverage information  ______________________________________________________________________    Chief Complaint   Abdominal Pain    History is obtained from the patient and the medical record    History of Present Illness   Byron Jasmine is a 74 year old female with a PMH of poor dentition and multi-joint osteoarthritis admitted on 9/2/2024 for partial small bowel obstruction 2/2 non-incarcerated ventral hernia.    Patient reports  that she initially came into the emergency department primarily for her jaw pain, but also the fact that she been having some abdominal pain and nausea/vomiting for about a day or so.  By the time that I examined her, she reports that all of her symptoms have entirely resolved with the exception of her dental pain, which she requests a dental evaluation with subsequent extraction of the 2 offending teeth.  She states that she is no longer having any abdominal pain, nausea, or vomiting.  She denies chest pain, dyspnea, visual changes.  She states that her bowels are moving and that she is passing gas.  She is wondering when she will be cleared to go home.    Past Medical History    No past medical history on file.    Past Surgical History   No past surgical history on file.    Prior to Admission Medications   Prior to Admission Medications   Prescriptions Last Dose Informant Patient Reported? Taking?   Cyanocobalamin (B-12) 1000 MCG TBCR   No No   Sig: Take 1 tablet by mouth daily   Patient not taking: Reported on 12/21/2022   Multiple Vitamin (MULTIVITAMIN) TABS 9/2/2024  Yes Yes   Sig: Take 1 tablet by mouth daily   acetaminophen (TYLENOL) 325 MG tablet 9/2/2024  No Yes   Sig: Take 1-2 tablets (325-650 mg) by mouth every 6 hours as needed for mild pain   cholecalciferol 34873 UNITS capsule 9/2/2024  No Yes   Sig: Take 1 capsule by mouth once a week   gabapentin (NEURONTIN) 100 MG capsule   No No   Sig: Take 1 capsule (100 mg) by mouth At Bedtime   Patient not taking: Reported on 12/21/2022   lidocaine (XYLOCAINE) 5 % ointment   No No   Sig: One application 4 x daily to affected areas lower back and knees if necessary   Patient not taking: Reported on 12/21/2022   loratadine (CLARITIN) 10 MG tablet 9/2/2024  Yes Yes   Sig: Take 1 tablet by mouth daily as needed   omeprazole (PRILOSEC) 20 MG DR capsule 9/2/2024  Yes Yes   Sig: Take 20 mg by mouth daily.      Facility-Administered Medications: None        Physical  Exam   Vital Signs: Temp: 97.1  F (36.2  C) Temp src: Oral BP: 114/62 Pulse: 74   Resp: 16 SpO2: 100 % O2 Device: None (Room air)    Weight: 142 lbs 0 oz  Vitals reviewed.  Constitutional: awake, alert, cooperative, in NAD  Eyes: Sclera without jaundice or injection, PERRLA, EOM intact   HENT: NCAT without lesions, oral cavity with MMM, fractured L first maxillary incisor and L first maxillary molar. No edema, erythema, or fluctuance appreciated over the maxillary sinus  Respiratory: Lungs CTAB without crackles, wheezing, rales, or increased work of breathing  Cardiovascular: RRR without murmurs or extra sounds, radial pulses 2+ bilaterally  Abdomen: Soft, non-distended, non-tender, positive bowel sounds. Soft, reducible, non-tender ventral hernia present.  Skin: Warm & dry, without lesions, erythema, rashes, or ecchymosis  Musculoskeletal: No extremity redness, swelling, or bony tenderness  Neurologic: A&Ox4, without focal deficit, cranial nerves II-XII grossly intact  Psychiatric: Alert & calm with appropriate affect, mood, insight, and thought processes    Medical Decision Making   Please see A&P for additional details of medical decision making    Data   ------------------------- PAST 24 HR DATA REVIEWED -----------------------------------------------    I have personally reviewed the following data over the past 24 hrs:    9.0  \   12.5   / 208     138 101 7.5 (L) /  143 (H)   3.6 23 0.63 \     ALT: 10 AST: 23 AP: 86 TBILI: 0.6   ALB: 4.2 TOT PROTEIN: 7.8 LIPASE: N/A     Trop: 6 BNP: 250     Procal: N/A CRP: N/A Lactic Acid: 1.5       INR:  1.06 PTT:  28   D-dimer:  N/A Fibrinogen:  N/A       Imaging results reviewed over the past 24 hrs:   Recent Results (from the past 24 hour(s))   XR Chest 2 Views    Narrative    EXAM: XR CHEST 2 VIEWS  LOCATION: [facility]  DATE: 9/3/2024    INDICATION: Chest pain  COMPARISON: 8/19/2022      Impression    IMPRESSION: Stable  opacification involving the anterior aspect of the left lower lung, likely representing chronic atelectasis. Left upper lung clear. Right lung clear. No pneumothorax or pleural fluid. Normal heart size and pulmonary vascularity.   Tortuous aorta. Minimal degenerative hypertrophic changes in the spine. Marked degenerative changes both glenohumeral joints.   CT Abdomen Pelvis w Contrast    Narrative    EXAM: CT ABDOMEN PELVIS W CONTRAST  LOCATION: [facility]  DATE: 9/3/2024    INDICATION: Abdominal pain and vomiting.  COMPARISON: None.  TECHNIQUE: CT scan of the abdomen and pelvis was performed following injection of IV contrast. Multiplanar reformats were obtained. Dose reduction techniques were used.  CONTRAST: 69 mL Isovue-370.    FINDINGS:   LOWER CHEST: Mild bibasilar atelectasis. Chronic linear scarring and bronchiectasis in the lingula. Coronary artery calcification.    HEPATOBILIARY: Cholecystectomy. No significant biliary dilatation allowing for postcholecystectomy state. Liver unremarkable.    PANCREAS: No ductal dilatation or acute inflammatory change.    SPLEEN: Normal size. Patent splenic vein.    ADRENAL GLANDS: Normal.    KIDNEYS/BLADDER: Symmetric renal enhancement. No urinary collecting system dilatation or obstructing calculi. Bladder unremarkable.    BOWEL: Small bowel and fat-containing ventral hernia with myofascial defect in the ventral midline of 2.6 x 4.5 cm. Mild fluid-filled distention/dilatation of small bowel loops that extend into the hernia could indicate early developing partial   obstruction. Distal small bowel decompressed. No evidence for bowel wall thickening, inflammatory change or perforation. No significant gastric dilatation. Extensive colonic diverticulosis without diverticulitis. Appendix unremarkable.    LYMPH NODES: No lymphadenopathy.    VASCULATURE: Normal-caliber aorta. Minimal vascular calcification.    PELVIC ORGANS:  Retroverted uterus. No free fluid.    MUSCULOSKELETAL: Mild superior and inferior endplate compression L1. Minimal lower lumbar facet arthropathy.      Impression    IMPRESSION:   1.  Small bowel and fat-containing ventral hernia with myofascial defect measuring 2.6 x 4.5 cm. Mild fluid-filled distention/mild dilatation of small bowel loops that extend into this hernia. This could indicate a partial obstruction. No evidence of   bowel wall thickening or inflammatory change. Consider follow-up Gastrografin challenge to assess for contrast passage through the small bowel in this area.    2.  Colonic diverticulosis without diverticulitis.    3.  Mild superior and inferior endplate compression L1, age indeterminate.   CT Facial Bones with Contrast    Narrative    EXAM: CT FACIAL BONES WITH CONTRAST  LOCATION: [facility]  DATE: 9/3/2024    INDICATION: Left upper dental pain, vomiting. Dental abscess?  COMPARISON: None.  CONTRAST: 69 mL Isovue-370.  TECHNIQUE: Routine CT Maxillofacial with IV contrast. Multiplanar reformats. Dose reduction techniques were used.     FINDINGS:  OSSEOUS STRUCTURES/SOFT TISSUES: There is significant beam hardening artifact from the patient's dental amalgam. There is a large cavity in the left first maxillary premolar with dehiscence along the gingivobuccal cortex. There is mild soft tissue   stranding in the left buccal space. No rim-enhancing fluid collection. Diffusely enlarged thyroid gland, previously evaluated by ultrasound.     ORBITAL CONTENTS: No acute abnormality.    SINUSES: No paranasal sinus mucosal disease.    VISUALIZED INTRACRANIAL CONTENTS: The included intracranial compartment demonstrates mild age related changes.       Impression    IMPRESSION:   1.  Exam is limited by significant beam hardening artifact from the patient's dental amalgam.  2.  Large cavity in the first left maxillary molar, with overlying stranding in the left  buccal space, suggestive of early cellulitis. No evidence of an organized drainable fluid collection.

## 2024-09-03 NOTE — DISCHARGE INSTRUCTIONS
You can follow up with the dental school (accepts MA and stat insurance along with other insurances). Their address is [address] [place] [phone]. Their number is [phone]. They have an Urgent Care for dental concerns, found at the above address.    Please follow up with general surgery, Dr. Saldivar. A referral was placed for you on discharge, and you should get a phone call within a few days to schedule. He would like to follow up with you at the clinic in approximately 6 weeks.     If your abdominal pain and nausea comes back and does not go away, please return to the hospital, as you might need urgent surgery to fix your hernia.

## 2024-09-03 NOTE — DISCHARGE SUMMARY
[facility]  Discharge Summary - Medicine & Pediatrics       Date of Admission:  9/2/2024  Date of Discharge:  9/3/2024  Discharging Provider: Dr. Dhara Easley  Discharge Service: [facility] Medicine Service    Discharge Diagnoses   # Partial Small Bowel Obstruction  # Ventral Hernia, non-incarcerated  # GERD  # Periodontal cellulitis without abscess  # Osteoarthritis of multiple joints      Clinically Significant Risk Factors     # Overweight: Estimated body mass index is 27.73 kg/m  as calculated from the following:    Height as of this encounter: 1.524 m (5').    Weight as of this encounter: 64.4 kg (142 lb).       Follow-ups Needed After Discharge   Follow-up Appointments     Adult [facility] Follow-up and recommended labs and tests      Follow up with primary care provider, Kaylee Capone, within 7   days for hospital follow- up.  No follow up labs or test are needed.    Follow up with surgery in 6 weeks to discuss repair of your hernia. You   should have your dental infection treated before hernia surgery.    Follow up with your dentist to follow up on your dental infection.    Appointments on [place] and/or [facility] (with [facility] or [facility]   provider or service). Call [phone] if you haven't heard regarding   these appointments within 7 days of discharge.              Discharge Disposition   Discharged to home  Condition at discharge: Stable    Hospital Course   Byron Jasmine was admitted on 9/2/2024 for partial small bowel obstruction 2/2 non-incarcerated ventral hernia, found to also have dental soft tissue infection.  The following problems were addressed during her hospitalization     # Partial Small Bowel Obstruction  # Ventral Hernia, non-incarcerated  # GERD  Initially presented to ED for dental pain, abdominal discomfort, and vomiting. Patient was hemodynamically stable, imaging showed evidence of small bowel and fat-containing ventral  hernia & some mild fluid distention of bowel loops present in the hernia, indicating likely partial obstruction. Obstruction then resolved on own, patient did not require NG tube decompression. General surgery was consulted, recommended outpatient follow up for elective repair of her hernia. Recommended treatment of her dental cavities prior to hernia repair. Patient was tolerating PO at time of discharge.  -General surgery referral placed at discharge     # Periodontal cellulitis without abscess  CT facial bones obtained in ED with evidence of early cellulitis in the left buccal space overlying the first maxillary molar but without abscess formation. Received IV ancef during hospitalization.  -Discharged with 10 day course of flagyl and levaquin  -Dental verbal referral provided at discharge, phone number given to patient      Consultations This Hospital Stay   SURGERY GENERAL ADULT IP CONSULT  DENTISTRY ADULT IP CONSULT    Code Status   Full Code       The patient was discussed with MD Yessica Wallace's [facility] SURG  [address] [address]  Phone: [phone]  Fax: [phone]  ______________________________________________________________________    Physical Exam   Vital Signs: Temp: 99.7  F (37.6  C) Temp src: Oral BP: 99/65 Pulse: 62   Resp: 16 SpO2: 97 % O2 Device: None (Room air)    Weight: 142 lbs 0 oz  Constitutional: awake, alert, cooperative, in NAD  Eyes: Sclera without jaundice or injection, PERRLA, EOM intact   HENT: NCAT without lesions, oral cavity with MMM, fractured L first maxillary incisor and L first maxillary molar. No edema, erythema, or fluctuance appreciated over the maxillary sinus  Respiratory: Lungs CTAB without crackles, wheezing, rales, or increased work of breathing  Cardiovascular: RRR without murmurs or extra sounds, radial pulses 2+ bilaterally  Abdomen: Soft, non-distended, non-tender, positive bowel  sounds. Soft, reducible, non-tender ventral hernia present.  Skin: Warm & dry, without lesions, erythema, rashes, or ecchymosis  Musculoskeletal: No extremity redness, swelling, or bony tenderness  Neurologic: A&Ox4, without focal deficit, cranial nerves II-XII grossly intact  Psychiatric: Alert & calm with appropriate affect, mood, insight, and thought processes         Primary Care Physician   Kaylee Capone    Discharge Orders      Dental Referral      Adult Gen Surg  Referral      Reason for your hospital stay    You were seen in the hospital for a hernia that partially obstructed your bowels that resolved on its own. You were also found to have a dental infection. You were treated with antibiotics and went home.     Activity    Your activity upon discharge: activity as tolerated     Adult [facility] Follow-up and recommended labs and tests    Follow up with primary care provider, Kaylee Capone, within 7 days for hospital follow- up.  No follow up labs or test are needed.    Follow up with surgery in 6 weeks to discuss repair of your hernia. You should have your dental infection treated before hernia surgery.    Follow up with your dentist to follow up on your dental infection.    Appointments on [place] and/or [facility] (with [facility] or [facility] provider or service). Call [phone] if you haven't heard regarding these appointments within 7 days of discharge.     Diet    Follow this diet upon discharge: Current Diet:Orders Placed This Encounter      Advance Diet as Tolerated: Regular Diet Adult; Regular Diet Adult       Significant Results and Procedures   Most Recent 3 CBC's:  Recent Labs   Lab Test 09/03/24  0757 09/02/24  2239   WBC 7.5 9.0   HGB 11.1* 12.5   MCV 90 90    208     Most Recent 3 BMP's:  Recent Labs   Lab Test 09/03/24  0757 09/02/24  2239    138   POTASSIUM 3.7 3.6   CHLORIDE 106 101   CO2 26 23   BUN 6.8* 7.5*   CR 0.66 0.63   ANIONGAP 8 14   ITALIA 8.5*  9.2   * 143*   ,   Results for orders placed or performed during the hospital encounter of 09/02/24   XR Chest 2 Views    Narrative    EXAM: XR CHEST 2 VIEWS  LOCATION: [facility]  DATE: 9/3/2024    INDICATION: Chest pain  COMPARISON: 8/19/2022      Impression    IMPRESSION: Stable opacification involving the anterior aspect of the left lower lung, likely representing chronic atelectasis. Left upper lung clear. Right lung clear. No pneumothorax or pleural fluid. Normal heart size and pulmonary vascularity.   Tortuous aorta. Minimal degenerative hypertrophic changes in the spine. Marked degenerative changes both glenohumeral joints.   CT Abdomen Pelvis w Contrast    Narrative    EXAM: CT ABDOMEN PELVIS W CONTRAST  LOCATION: [facility]  DATE: 9/3/2024    INDICATION: Abdominal pain and vomiting.  COMPARISON: None.  TECHNIQUE: CT scan of the abdomen and pelvis was performed following injection of IV contrast. Multiplanar reformats were obtained. Dose reduction techniques were used.  CONTRAST: 69 mL Isovue-370.    FINDINGS:   LOWER CHEST: Mild bibasilar atelectasis. Chronic linear scarring and bronchiectasis in the lingula. Coronary artery calcification.    HEPATOBILIARY: Cholecystectomy. No significant biliary dilatation allowing for postcholecystectomy state. Liver unremarkable.    PANCREAS: No ductal dilatation or acute inflammatory change.    SPLEEN: Normal size. Patent splenic vein.    ADRENAL GLANDS: Normal.    KIDNEYS/BLADDER: Symmetric renal enhancement. No urinary collecting system dilatation or obstructing calculi. Bladder unremarkable.    BOWEL: Small bowel and fat-containing ventral hernia with myofascial defect in the ventral midline of 2.6 x 4.5 cm. Mild fluid-filled distention/dilatation of small bowel loops that extend into the hernia could indicate early developing partial   obstruction. Distal small bowel  decompressed. No evidence for bowel wall thickening, inflammatory change or perforation. No significant gastric dilatation. Extensive colonic diverticulosis without diverticulitis. Appendix unremarkable.    LYMPH NODES: No lymphadenopathy.    VASCULATURE: Normal-caliber aorta. Minimal vascular calcification.    PELVIC ORGANS: Retroverted uterus. No free fluid.    MUSCULOSKELETAL: Mild superior and inferior endplate compression L1. Minimal lower lumbar facet arthropathy.      Impression    IMPRESSION:   1.  Small bowel and fat-containing ventral hernia with myofascial defect measuring 2.6 x 4.5 cm. Mild fluid-filled distention/mild dilatation of small bowel loops that extend into this hernia. This could indicate a partial obstruction. No evidence of   bowel wall thickening or inflammatory change. Consider follow-up Gastrografin challenge to assess for contrast passage through the small bowel in this area.    2.  Colonic diverticulosis without diverticulitis.    3.  Mild superior and inferior endplate compression L1, age indeterminate.   CT Facial Bones with Contrast    Narrative    EXAM: CT FACIAL BONES WITH CONTRAST  LOCATION: [facility]  DATE: 9/3/2024    INDICATION: Left upper dental pain, vomiting. Dental abscess?  COMPARISON: None.  CONTRAST: 69 mL Isovue-370.  TECHNIQUE: Routine CT Maxillofacial with IV contrast. Multiplanar reformats. Dose reduction techniques were used.     FINDINGS:  OSSEOUS STRUCTURES/SOFT TISSUES: There is significant beam hardening artifact from the patient's dental amalgam. There is a large cavity in the left first maxillary premolar with dehiscence along the gingivobuccal cortex. There is mild soft tissue   stranding in the left buccal space. No rim-enhancing fluid collection. Diffusely enlarged thyroid gland, previously evaluated by ultrasound.     ORBITAL CONTENTS: No acute abnormality.    SINUSES: No paranasal sinus mucosal  disease.    VISUALIZED INTRACRANIAL CONTENTS: The included intracranial compartment demonstrates mild age related changes.       Impression    IMPRESSION:   1.  Exam is limited by significant beam hardening artifact from the patient's dental amalgam.  2.  Large cavity in the first left maxillary molar, with overlying stranding in the left buccal space, suggestive of early cellulitis. No evidence of an organized drainable fluid collection.       Discharge Medications   Current Discharge Medication List        START taking these medications    Details   levofloxacin (LEVAQUIN) 750 MG tablet Take 1 tablet (750 mg) by mouth daily for 10 days.  Qty: 10 tablet, Refills: 0    Associated Diagnoses: Facial cellulitis      metroNIDAZOLE (FLAGYL) 500 MG tablet Take 1 tablet (500 mg) by mouth 3 times daily for 10 days.  Qty: 30 tablet, Refills: 0    Associated Diagnoses: Facial cellulitis           CONTINUE these medications which have NOT CHANGED    Details   acetaminophen (TYLENOL) 325 MG tablet Take 1-2 tablets (325-650 mg) by mouth every 6 hours as needed for mild pain  Qty: 100 tablet, Refills: PRN    Associated Diagnoses: Pain in joint, shoulder region, unspecified laterality; Lumbago; OA (osteoarthritis) of knee; Numbness and tingling of foot      cholecalciferol 32457 UNITS capsule Take 1 capsule by mouth once a week  Qty: 4 capsule, Refills: 11    Associated Diagnoses: Vitamin D insufficiency      loratadine (CLARITIN) 10 MG tablet Take 1 tablet by mouth daily as needed      Multiple Vitamin (MULTIVITAMIN) TABS Take 1 tablet by mouth daily      omeprazole (PRILOSEC) 20 MG DR capsule Take 20 mg by mouth daily.      Cyanocobalamin (B-12) 1000 MCG TBCR Take 1 tablet by mouth daily  Qty: 30 tablet, Refills: PRN    Associated Diagnoses: Numbness and tingling of foot      gabapentin (NEURONTIN) 100 MG capsule Take 1 capsule (100 mg) by mouth At Bedtime  Qty: 30 capsule, Refills: PRN    Associated Diagnoses: Numbness and  tingling of foot      lidocaine (XYLOCAINE) 5 % ointment One application 4 x daily to affected areas lower back and knees if necessary  Qty: 142 g, Refills: 11    Associated Diagnoses: Lumbago; OA (osteoarthritis) of knee; Injury, other and unspecified, unspecified site; Pain in joint, shoulder region, unspecified laterality           Allergies   No Known Allergies

## 2024-09-03 NOTE — ED TRIAGE NOTES
Patient presents due to swelling and pain to left upper jaw due to broken tooth; accompanied by vomiting.   Triage Assessment (Adult)       Row Name 09/02/24 4256          Triage Assessment    Airway WDL WDL        Respiratory WDL    Respiratory WDL WDL        Skin Circulation/Temperature WDL    Skin Circulation/Temperature WDL WDL        Cardiac WDL    Cardiac WDL WDL        Peripheral/Neurovascular WDL    Peripheral Neurovascular WDL WDL        Cognitive/Neuro/Behavioral WDL    Cognitive/Neuro/Behavioral WDL WDL

## 2024-09-23 ENCOUNTER — APPOINTMENT (OUTPATIENT)
Dept: INTERPRETER SERVICES | Facility: CLINIC | Age: 74
End: 2024-09-23

## 2025-04-30 ENCOUNTER — MEDICAL CORRESPONDENCE (OUTPATIENT)
Dept: HEALTH INFORMATION MANAGEMENT | Facility: CLINIC | Age: 75
End: 2025-04-30
Payer: COMMERCIAL

## 2025-05-01 ENCOUNTER — TRANSCRIBE ORDERS (OUTPATIENT)
Dept: OTHER | Age: 75
End: 2025-05-01

## 2025-05-01 DIAGNOSIS — M65.332 TRIGGER MIDDLE FINGER OF LEFT HAND: Primary | ICD-10-CM

## 2025-05-20 ENCOUNTER — OFFICE VISIT (OUTPATIENT)
Dept: ORTHOPEDICS | Facility: CLINIC | Age: 75
End: 2025-05-20
Attending: FAMILY MEDICINE
Payer: COMMERCIAL

## 2025-05-20 DIAGNOSIS — M65.332 TRIGGER MIDDLE FINGER OF LEFT HAND: ICD-10-CM

## 2025-05-20 PROCEDURE — 20550 NJX 1 TENDON SHEATH/LIGAMENT: CPT | Mod: F2 | Performed by: FAMILY MEDICINE

## 2025-05-20 PROCEDURE — 99203 OFFICE O/P NEW LOW 30 MIN: CPT | Mod: 25 | Performed by: FAMILY MEDICINE

## 2025-05-20 RX ADMIN — LIDOCAINE HYDROCHLORIDE 0.5 ML: 10 INJECTION, SOLUTION EPIDURAL; INFILTRATION; INTRACAUDAL; PERINEURAL at 12:53

## 2025-05-20 RX ADMIN — BETAMETHASONE SODIUM PHOSPHATE AND BETAMETHASONE ACETATE 0.5 MG: 3; 3 INJECTION, SUSPENSION INTRA-ARTICULAR; INTRALESIONAL; INTRAMUSCULAR; SOFT TISSUE at 12:53

## 2025-05-20 NOTE — PROGRESS NOTES
[facility]ICS [facility] SUR[facility]  SPORTS & ORTHOPEDIC CLINIC VISIT     May 20, 2025        ASSESSMENT & PLAN    75-year-old with trigger finger of the left middle finger    Reviewed imaging and assessment with patient in detail  Provided with oval 8 splint.  We discussed use of topical anti-inflammatory.  Discussed indication for injection.  Ultimately she opted for injection today.  See procedure note for details.  Can follow-up after 1 month if symptoms are only partially relieved with initial injection.  Recommended use of splint at nighttime and as needed during the day during the next month    Forrest Bermudez MD  [facility] SPORTS MEDICINE CLINIC [place]    -----  Chief Complaint   Patient presents with    Left Hand - Pain       SUBJECTIVE  Byron Jasmine is a/an 75 year old female who is seen in consultation at the request of  Oneyda Alexander M.D. for evaluation of  left middle finger.     The patient is seen with their daughter in law who is also .  The patient is Right handed    Onset: 3 month(s) ago. Reports insidious onset without acute precipitating event.  Location of Pain: left middle finger   Worsened by: using hands, gripping  Better with: Tylenol   Treatments tried: Tylenol  Associated symptoms: locking or catching    Orthopedic/Surgical history: NO  Social History/Occupation: Retired       REVIEW OF SYSTEMS:  Do you have fever, chills, weight loss? No  Do you have any vision problems? No  Do you have any chest pain or edema? No  Do you have any shortness of breath or wheezing?  No  Do you have stomach problems? No  Do you have any numbness or focal weakness? No  Do you have diabetes? No  Do you have problems with bleeding or clotting? No  Do you have an rashes or other skin lesions? No    OBJECTIVE:  There were no vitals taken for this visit.     General  - alert, pleasant, no distress  CV  - normal radial pulse, cap refill brisk  Musculoskeletal - finger  - inspection: no  atrophy, normal joint alignment, no swelling  - palpation: TTP over the volar aspect of the third metacarpal head.  Otherwise no bony or soft tissue tenderness, no tenderness at the anatomical snuffbox  - ROM:  MCP 90 deg flexion   0 deg extension    deg flexion   0 deg extension, locking with passive flexion extension at the PIP   DIP 80 deg flexion   0 deg extension  - strength: 5/5  strength, 5/5 wrist abduction, 5/5 flexion, extension, pronation, supination, adduction  - special tests:  (-) varus  (-) valgus  Neuro  - no numbness, no motor deficit, grossly normal coordination, normal muscle tone  Skin  - no ecchymosis, erythema, warmth, or induration, no obvious rash        RADIOLOGY:    No imaging this visit             Hand / Upper Extremity Injection/Arthrocentesis: L long A1    Date/Time: 5/20/2025 12:53 PM    Performed by: Forrest Bermudez MD  Authorized by: Forrest Bermudez MD    Indications:  Tendon swelling  Needle Size:  25 G  Guidance: landmark    Condition: trigger finger    Location:  Long finger    Site:  L long A1  Medications:  0.5 mg betamethasone acet & sod phos 6 (3-3) MG/ML; 0.5 mL lidocaine (PF) 1 %  Outcome:  Tolerated well, no immediate complications  Procedure discussed: discussed risks, benefits, and alternatives    Consent Given by:  Patient  Timeout: timeout called immediately prior to procedure    Prep: patient was prepped and draped in usual sterile fashion     There were no complications. The patient tolerated the procedure well. There was minimal bleeding.   The patient was instructed to ice the finger upon leaving clinic and refrain from overuse over the next 2 days.   The patient was instructed to go to the emergency room with any unusual pain, swelling, or redness occurred in the injected area.     Forrest Bermudez MD

## 2025-05-20 NOTE — NURSING NOTE
[address] [address]  Dept: [phone]  ______________________________________________________________________________    Patient: Byron Jasmine   : 1950   MRN: [id number]   May 20, 2025    INVASIVE PROCEDURE SAFETY CHECKLIST    Date: May 20, 2025   Procedure:Left Middle Finger, trigger finger CSI   Patient Name: Byron Jasmine  MRN: [id number]  YOB: 1950    Action: Complete sections as appropriate. Any discrepancy results in a HARD COPY until resolved.     PRE PROCEDURE:  Patient ID verified with 2 identifiers (name and  or MRN): Yes  Procedure and site verified with patient/designee (when able): Yes  Accurate consent documentation in medical record: Yes  H&P (or appropriate assessment) documented in medical record: Yes  H&P must be up to 20 days prior to procedure and updates within 24 hours of procedure as applicable: Yes  Relevant diagnostic and radiology test results appropriately labeled and displayed as applicable: Yes  Procedure site(s) marked with provider initials: NA    TIMEOUT:  Time-Out performed immediately prior to starting procedure, including verbal and active participation of all team members addressing the following:Yes  * Correct patient identify  * Confirmed that the correct side and site are marked  * An accurate procedure consent form  * Agreement on the procedure to be done  * Correct patient position  * Relevant images and results are properly labeled and appropriately displayed  * The need to administer antibiotics or fluids for irrigation purposes during the procedure as applicable   * Safety precautions based on patient history or medication use    DURING PROCEDURE: Verification of correct person, site, and procedures any time the responsibility for care of the patient is transferred to another member of the care team.       Prior to injection, verified patient identity using patient's name and date  of birth.  Due to injection administration, patient instructed to remain in clinic for 15 minutes  afterwards, and to report any adverse reaction to me immediately.    Tendon sheath injection was performed.     Drug Amount Wasted:  Yes: 4.5 mg/ml   Vial/Syringe: Single dose vial  Expiration Date:  12/31/25 10/30/28      Gilda Weir, ATC  May 20, 2025

## 2025-05-24 RX ORDER — LIDOCAINE HYDROCHLORIDE 10 MG/ML
0.5 INJECTION, SOLUTION EPIDURAL; INFILTRATION; INTRACAUDAL; PERINEURAL
Status: COMPLETED | OUTPATIENT
Start: 2025-05-20 | End: 2025-05-20

## 2025-05-24 RX ORDER — BETAMETHASONE SODIUM PHOSPHATE AND BETAMETHASONE ACETATE 3; 3 MG/ML; MG/ML
0.5 INJECTION, SUSPENSION INTRA-ARTICULAR; INTRALESIONAL; INTRAMUSCULAR; SOFT TISSUE
Status: COMPLETED | OUTPATIENT
Start: 2025-05-20 | End: 2025-05-20

## (undated) RX ORDER — LIDOCAINE HYDROCHLORIDE 10 MG/ML
INJECTION, SOLUTION EPIDURAL; INFILTRATION; INTRACAUDAL; PERINEURAL
Status: DISPENSED
Start: 2025-05-20

## (undated) RX ORDER — BETAMETHASONE SODIUM PHOSPHATE AND BETAMETHASONE ACETATE 3; 3 MG/ML; MG/ML
INJECTION, SUSPENSION INTRA-ARTICULAR; INTRALESIONAL; INTRAMUSCULAR; SOFT TISSUE
Status: DISPENSED
Start: 2025-05-20